# Patient Record
Sex: FEMALE | Race: BLACK OR AFRICAN AMERICAN
[De-identification: names, ages, dates, MRNs, and addresses within clinical notes are randomized per-mention and may not be internally consistent; named-entity substitution may affect disease eponyms.]

---

## 2019-06-03 ENCOUNTER — HOSPITAL ENCOUNTER (OUTPATIENT)
Dept: HOSPITAL 82 - ED | Age: 64
Setting detail: OBSERVATION
LOS: 2 days | Discharge: HOME | End: 2019-06-05
Attending: INTERNAL MEDICINE | Admitting: INTERNAL MEDICINE
Payer: MEDICARE

## 2019-06-03 VITALS — SYSTOLIC BLOOD PRESSURE: 129 MMHG | DIASTOLIC BLOOD PRESSURE: 76 MMHG

## 2019-06-03 VITALS — HEIGHT: 64 IN | WEIGHT: 187.39 LBS | BODY MASS INDEX: 31.99 KG/M2

## 2019-06-03 VITALS — DIASTOLIC BLOOD PRESSURE: 69 MMHG | SYSTOLIC BLOOD PRESSURE: 155 MMHG

## 2019-06-03 VITALS — SYSTOLIC BLOOD PRESSURE: 180 MMHG | DIASTOLIC BLOOD PRESSURE: 89 MMHG

## 2019-06-03 VITALS — DIASTOLIC BLOOD PRESSURE: 89 MMHG | SYSTOLIC BLOOD PRESSURE: 144 MMHG

## 2019-06-03 VITALS — DIASTOLIC BLOOD PRESSURE: 77 MMHG | SYSTOLIC BLOOD PRESSURE: 130 MMHG

## 2019-06-03 DIAGNOSIS — T46.5X6A: ICD-10-CM

## 2019-06-03 DIAGNOSIS — M17.0: ICD-10-CM

## 2019-06-03 DIAGNOSIS — I50.30: ICD-10-CM

## 2019-06-03 DIAGNOSIS — J20.9: ICD-10-CM

## 2019-06-03 DIAGNOSIS — K42.0: Primary | ICD-10-CM

## 2019-06-03 DIAGNOSIS — J44.0: ICD-10-CM

## 2019-06-03 DIAGNOSIS — I25.10: ICD-10-CM

## 2019-06-03 DIAGNOSIS — F17.200: ICD-10-CM

## 2019-06-03 DIAGNOSIS — Z91.120: ICD-10-CM

## 2019-06-03 DIAGNOSIS — K29.70: ICD-10-CM

## 2019-06-03 DIAGNOSIS — N18.9: ICD-10-CM

## 2019-06-03 DIAGNOSIS — I13.0: ICD-10-CM

## 2019-06-03 DIAGNOSIS — Z23: ICD-10-CM

## 2019-06-03 LAB
ALBUMIN SERPL-MCNC: 4.4 G/DL (ref 3.2–5)
ALP SERPL-CCNC: 89 U/L (ref 38–126)
ANION GAP SERPL CALCULATED.3IONS-SCNC: 15 MMOL/L
AST SERPL-CCNC: 15 U/L (ref 9–36)
BASOPHILS NFR BLD AUTO: 0 % (ref 0–3)
BILIRUB UR QL STRIP.AUTO: NEGATIVE
BUN SERPL-MCNC: 12 MG/DL (ref 8–23)
BUN/CREAT SERPL: 24
CHLORIDE SERPL-SCNC: 104 MMOL/L (ref 95–108)
CHOLEST SERPL-MCNC: 189 MG/DL (ref 0–199)
CHOLEST/HDLC SERPL: 3.3 {RATIO}
CO2 SERPL-SCNC: 25 MMOL/L (ref 22–30)
COLOR UR AUTO: YELLOW
CREAT SERPL-MCNC: 0.5 MG/DL (ref 0.5–1)
EOSINOPHIL NFR BLD AUTO: 0 % (ref 0–8)
ERYTHROCYTE [DISTWIDTH] IN BLOOD BY AUTOMATED COUNT: 16.9 % (ref 11.5–15.5)
GLUCOSE UR STRIP.AUTO-MCNC: NEGATIVE MG/DL
HCT VFR BLD AUTO: 42.3 % (ref 37–47)
HDLC SERPL-MCNC: 57 MG/DL (ref 40–?)
HGB BLD-MCNC: 13.5 G/DL (ref 12–16)
HGB UR QL STRIP.AUTO: (no result)
IMM GRANULOCYTES NFR BLD: 0.3 % (ref 0–5)
KETONES UR STRIP.AUTO-MCNC: NEGATIVE MG/DL
LDLC SERPL CALC-MCNC: 115 MG/DL
LEUKOCYTE ESTERASE UR QL STRIP.AUTO: NEGATIVE
LIPASE SERPL-CCNC: 57 U/L (ref 23–300)
LYMPHOCYTES NFR BLD: 11 % (ref 15–41)
MCH RBC QN AUTO: 26.8 PG  CALC (ref 26–32)
MCHC RBC AUTO-ENTMCNC: 31.9 G/L CALC (ref 32–36)
MCV RBC AUTO: 84.1 FL  CALC (ref 80–100)
MONOCYTES NFR BLD AUTO: 4 % (ref 2–13)
NEUTROPHILS # BLD AUTO: 7.39 THOU/UL (ref 2–7.15)
NEUTROPHILS NFR BLD AUTO: 85 % (ref 42–76)
NITRITE UR QL STRIP.AUTO: NEGATIVE
PH UR STRIP.AUTO: 6.5 [PH] (ref 4.5–8)
PLATELET # BLD AUTO: 271 THOU/UL (ref 130–400)
POTASSIUM SERPL-SCNC: 3.8 MMOL/L (ref 3.5–5.1)
PROT SERPL-MCNC: 7.6 G/DL (ref 6.3–8.2)
PROT UR QL STRIP.AUTO: 30 MG/DL
RBC # BLD AUTO: 5.03 MILL/UL (ref 4.2–5.6)
RBC #/AREA URNS HPF: (no result) RBC/HPF (ref 0–5)
SODIUM SERPL-SCNC: 140 MMOL/L (ref 137–146)
SP GR UR STRIP.AUTO: 1.01
SQUAMOUS URNS QL MICRO: (no result) EPI/HPF
TRIGL SERPL-MCNC: 83 MG/DL (ref 30–149)
UROBILINOGEN UR QL STRIP.AUTO: 0.2 E.U./DL
VLDLC SERPL CALC-MCNC: 17 MG/DL

## 2019-06-03 PROCEDURE — S0164 INJECTION PANTROPRAZOLE: HCPCS

## 2019-06-03 NOTE — NUR
REPORT RECEIVED FROM BRENDON IN ED, PT ARRIVED ON UNIT @ 1232 VIA STRETCHER BY
ED STAFF, ALERT AND ORIENTED X 4, ORIENTED TO ROOM AND CALL BELL. PT C/O ABD
PAIN AT 10/10 AT THIS TIME, VITAL SIGNS MEASURED AND RECORDED, CALL BELL IN
REACH, WILL CONTINUE TO MONITOR.

## 2019-06-03 NOTE — NUR
DR GARCIA HERE ROUNDING, DISCUSSED PLAN OF CARE TO GIVE IV FLUIDS AND
ANALGESICS WHICH HE WILL PROCESS.

## 2019-06-03 NOTE — NUR
PATIENT RESTING AWAITNG DISPOSITION. PATIENT STATES PAIN IMPROVED SLIGHTYL FROM
BEING PREVIOUSLY MEDICATED PAIN 5 ON 0-10 SCALE. MD NOTIFIED

## 2019-06-03 NOTE — NUR
DR. GARCIA PLACED CONSULTATION IN FOR  WITH THIS PATIENT. WRITER
CALLED  REGARDING PT [phone]. GAVE HIM NAME, DIAGNOSIS, AND
ROOM NUMBER. DR. WHITE STATED HE WILL LOOK AT ALL THE TESTS ON PATIENT.

## 2019-06-03 NOTE — NUR
SECOND ATTEMPT TO CALL REPORT [facility] AND UNABLE
TO COME TO THE PHONE

## 2019-06-03 NOTE — NUR
PT RESTING IN BED, ALERT AND ORIENTED. RESPIRATIONS EVEN AND UNLABORED ON RA,
LUNGS SOUND COARSE. TELE IN PLACE. # 22 LFA IN FUSSING D5 1/2 NS @ 125. PEDAL
PULSES STRONG. PT EDUCATED ON NPO DIET. SAFETY PRECAUTIONS IN PLACE. WILL
CONTINUE TO MONITOR.

## 2019-06-03 NOTE — NUR
DR GARCIA HERE ROUNDING, DISCUSSED PLAN OF CARE AND WRITING ORDERS AT THIS
TIME. PT JUST HAD EPISODE VOMITUS, MD AWARE AND WILL ADDRESS CONDITION.

## 2019-06-03 NOTE — NUR
REPORT RECEIVED FROM JEANNE MILLER. PT RESTING IN BED, ALERT AND ORIENTED, PT
STATES HER PAIN IS MUCH BETTER AND IS NOW ONLY A 2/10. SAFETY PRECAUTIONS IN
PLACE. PT ENCOURAGED TO USE CALL ESCOBAR. WILL CONTINUE TO MONITOR.

## 2019-06-04 VITALS — DIASTOLIC BLOOD PRESSURE: 87 MMHG | SYSTOLIC BLOOD PRESSURE: 142 MMHG

## 2019-06-04 VITALS — DIASTOLIC BLOOD PRESSURE: 71 MMHG | SYSTOLIC BLOOD PRESSURE: 107 MMHG

## 2019-06-04 VITALS — SYSTOLIC BLOOD PRESSURE: 122 MMHG | DIASTOLIC BLOOD PRESSURE: 77 MMHG

## 2019-06-04 VITALS — DIASTOLIC BLOOD PRESSURE: 75 MMHG | SYSTOLIC BLOOD PRESSURE: 135 MMHG

## 2019-06-04 VITALS — DIASTOLIC BLOOD PRESSURE: 66 MMHG | SYSTOLIC BLOOD PRESSURE: 104 MMHG

## 2019-06-04 VITALS — DIASTOLIC BLOOD PRESSURE: 67 MMHG | SYSTOLIC BLOOD PRESSURE: 124 MMHG

## 2019-06-04 VITALS — DIASTOLIC BLOOD PRESSURE: 75 MMHG | SYSTOLIC BLOOD PRESSURE: 152 MMHG

## 2019-06-04 VITALS — DIASTOLIC BLOOD PRESSURE: 60 MMHG | SYSTOLIC BLOOD PRESSURE: 132 MMHG

## 2019-06-04 LAB
ALBUMIN SERPL-MCNC: 3.3 G/DL (ref 3.2–5)
ALP SERPL-CCNC: 58 U/L (ref 38–126)
ANION GAP SERPL CALCULATED.3IONS-SCNC: 13 MMOL/L
AST SERPL-CCNC: 24 U/L (ref 9–36)
BASOPHILS NFR BLD AUTO: 0 % (ref 0–3)
BUN SERPL-MCNC: 14 MG/DL (ref 8–23)
BUN/CREAT SERPL: 21
CHLORIDE SERPL-SCNC: 103 MMOL/L (ref 95–108)
CO2 SERPL-SCNC: 25 MMOL/L (ref 22–30)
CREAT SERPL-MCNC: 0.7 MG/DL (ref 0.5–1)
EOSINOPHIL NFR BLD AUTO: 1 % (ref 0–8)
ERYTHROCYTE [DISTWIDTH] IN BLOOD BY AUTOMATED COUNT: 17.1 % (ref 11.5–15.5)
HCT VFR BLD AUTO: 36.1 % (ref 37–47)
HGB BLD-MCNC: 11.6 G/DL (ref 12–16)
IMM GRANULOCYTES NFR BLD: 0.3 % (ref 0–5)
LYMPHOCYTES NFR BLD: 28 % (ref 15–41)
MCH RBC QN AUTO: 26.9 PG  CALC (ref 26–32)
MCHC RBC AUTO-ENTMCNC: 32.1 G/L CALC (ref 32–36)
MCV RBC AUTO: 83.6 FL  CALC (ref 80–100)
MONOCYTES NFR BLD AUTO: 11 % (ref 2–13)
NEUTROPHILS # BLD AUTO: 4.14 THOU/UL (ref 2–7.15)
NEUTROPHILS NFR BLD AUTO: 59 % (ref 42–76)
PLATELET # BLD AUTO: 251 THOU/UL (ref 130–400)
POTASSIUM SERPL-SCNC: 3.6 MMOL/L (ref 3.5–5.1)
PROT SERPL-MCNC: 5.9 G/DL (ref 6.3–8.2)
RBC # BLD AUTO: 4.32 MILL/UL (ref 4.2–5.6)
SODIUM SERPL-SCNC: 138 MMOL/L (ref 137–146)

## 2019-06-04 PROCEDURE — 3E0234Z INTRODUCTION OF SERUM, TOXOID AND VACCINE INTO MUSCLE, PERCUTANEOUS APPROACH: ICD-10-PCS | Performed by: INTERNAL MEDICINE

## 2019-06-04 NOTE — NUR
ASSESSMENT DONE. PT IS A&O X3. PT STATED PAIN IN ABD 8/10. MEDICATED PT WITH
DILAUDID SEE EMAR. IVF INFUSING WELL. PT NPO. TELE IN PLACE. SAFETY
PRECAUTIONS REINFORCED AND CALL LIGHT IN REACH.

## 2019-06-04 NOTE — NUR
PT RESTING IN BED, WITH EYES CLOSED. RESPIRATIONS EVEN AND UNLABORED ON RA. NO
S/S OF DISTRESS AT THIS TIME. WILL CONTINUE TO MONITOR.

## 2019-06-04 NOTE — NUR
PT RESTING IN BED, ALERT AND ORIENTED. REPIRATIONS EVEN AND UNLABORED ON RA,
LUNGS SOUND COARSE THROUGH OUT. #22 LFA INFUSING D5 1/2 NS @ 125 ML/HR,
APPEARS HEALTHY. TELE IN PLACE. PT ENCOURAGED TO USE CALL BELL IF NEEDS SHOULD
ARISE. SAFETY PRECAUTIONS IN PLACE. WILL CONTINUE TO MONITOR.

## 2019-06-04 NOTE — NUR
REPORT RECEIVED FROM JEANNE PAYTON. PT RESTING IN BED. ALERT AND ORIENTED. NO
S/S OF DISTRESS AT THIS TIME. WILL CONTINUE TO MONITOR.

## 2019-06-04 NOTE — NUR
PT RESTING IN BED. RESPIRATIONS EVEN AND UNLABORED ON RA. NO S/S OF DISTRESS
AT THIS TIME. WILL CONTIUE TO MONITOR.

## 2019-06-04 NOTE — NUR
PT USING THE BSC. PT STATED PAIN IN ABD 6/10. TOLD PT PAIN MEDICATION IS NOT
DUE YET. PT VERBALIZED UNDERSTANDING. PT DENIES ANY OTHER NEEDS AT THIS TIME.
CALL LIGHT IN REACH.

## 2019-06-05 VITALS — DIASTOLIC BLOOD PRESSURE: 74 MMHG | SYSTOLIC BLOOD PRESSURE: 113 MMHG

## 2019-06-05 VITALS — DIASTOLIC BLOOD PRESSURE: 79 MMHG | SYSTOLIC BLOOD PRESSURE: 156 MMHG

## 2019-06-05 VITALS — SYSTOLIC BLOOD PRESSURE: 166 MMHG | DIASTOLIC BLOOD PRESSURE: 76 MMHG

## 2019-06-05 VITALS — DIASTOLIC BLOOD PRESSURE: 71 MMHG | SYSTOLIC BLOOD PRESSURE: 113 MMHG

## 2019-06-05 LAB
ANION GAP SERPL CALCULATED.3IONS-SCNC: 11 MMOL/L
BASOPHILS NFR BLD AUTO: 0 % (ref 0–3)
BUN SERPL-MCNC: 10 MG/DL (ref 8–23)
BUN/CREAT SERPL: 15
CHLORIDE SERPL-SCNC: 105 MMOL/L (ref 95–108)
CO2 SERPL-SCNC: 26 MMOL/L (ref 22–30)
CREAT SERPL-MCNC: 0.6 MG/DL (ref 0.5–1)
EOSINOPHIL NFR BLD AUTO: 3 % (ref 0–8)
ERYTHROCYTE [DISTWIDTH] IN BLOOD BY AUTOMATED COUNT: 17.2 % (ref 11.5–15.5)
HCT VFR BLD AUTO: 36.6 % (ref 37–47)
HGB BLD-MCNC: 11.7 G/DL (ref 12–16)
IMM GRANULOCYTES NFR BLD: 0.2 % (ref 0–5)
LYMPHOCYTES NFR BLD: 28 % (ref 15–41)
MCH RBC QN AUTO: 27 PG  CALC (ref 26–32)
MCHC RBC AUTO-ENTMCNC: 32 G/L CALC (ref 32–36)
MCV RBC AUTO: 84.5 FL  CALC (ref 80–100)
MONOCYTES NFR BLD AUTO: 9 % (ref 2–13)
NEUTROPHILS # BLD AUTO: 3.84 THOU/UL (ref 2–7.15)
NEUTROPHILS NFR BLD AUTO: 60 % (ref 42–76)
PLATELET # BLD AUTO: 245 THOU/UL (ref 130–400)
POTASSIUM SERPL-SCNC: 3.7 MMOL/L (ref 3.5–5.1)
RBC # BLD AUTO: 4.33 MILL/UL (ref 4.2–5.6)
SODIUM SERPL-SCNC: 139 MMOL/L (ref 137–146)

## 2019-06-05 NOTE — NUR
DISCUSS WITH PT RE: HER D/C PAPERS. PT VERBALIZED UNDERSTANDING. PT STATED
WAITING FOR HER RIDE TO COME. CALL LIGHT IN REACH.

## 2019-06-05 NOTE — NUR
PT RESTING IN BED, RESPIRATIONS EVEN AND UNLABORED ON RA, NO S/S OF DISTRESS
AT THIS TIME. WILL CONTINUE TO MONITOR.

## 2019-06-05 NOTE — NUR
PT RESTING IN BED WITH EYES CLOSED, NO S/S OD DISTRESS AT THIS TIME. SAFETY
PRECAUTIONS IN PLACE. WILL CONTINUE TO MONITOR.

## 2020-04-15 ENCOUNTER — HOSPITAL ENCOUNTER (INPATIENT)
Dept: HOSPITAL 82 - ED | Age: 65
LOS: 5 days | Discharge: PSYCHIATRIC OR SUBSTANCE USE FACILITY | DRG: 337 | End: 2020-04-20
Attending: INTERNAL MEDICINE | Admitting: INTERNAL MEDICINE
Payer: MEDICARE

## 2020-04-15 VITALS — SYSTOLIC BLOOD PRESSURE: 198 MMHG | DIASTOLIC BLOOD PRESSURE: 99 MMHG

## 2020-04-15 VITALS — DIASTOLIC BLOOD PRESSURE: 109 MMHG | SYSTOLIC BLOOD PRESSURE: 189 MMHG

## 2020-04-15 VITALS — SYSTOLIC BLOOD PRESSURE: 204 MMHG | DIASTOLIC BLOOD PRESSURE: 106 MMHG

## 2020-04-15 VITALS — HEIGHT: 64 IN | WEIGHT: 185.31 LBS | BODY MASS INDEX: 31.64 KG/M2

## 2020-04-15 VITALS — SYSTOLIC BLOOD PRESSURE: 199 MMHG | DIASTOLIC BLOOD PRESSURE: 104 MMHG

## 2020-04-15 VITALS — DIASTOLIC BLOOD PRESSURE: 98 MMHG | SYSTOLIC BLOOD PRESSURE: 168 MMHG

## 2020-04-15 VITALS — SYSTOLIC BLOOD PRESSURE: 179 MMHG | DIASTOLIC BLOOD PRESSURE: 104 MMHG

## 2020-04-15 VITALS — DIASTOLIC BLOOD PRESSURE: 106 MMHG | SYSTOLIC BLOOD PRESSURE: 169 MMHG

## 2020-04-15 VITALS — SYSTOLIC BLOOD PRESSURE: 195 MMHG | DIASTOLIC BLOOD PRESSURE: 98 MMHG

## 2020-04-15 VITALS — DIASTOLIC BLOOD PRESSURE: 100 MMHG | SYSTOLIC BLOOD PRESSURE: 180 MMHG

## 2020-04-15 VITALS — SYSTOLIC BLOOD PRESSURE: 205 MMHG | DIASTOLIC BLOOD PRESSURE: 110 MMHG

## 2020-04-15 VITALS — DIASTOLIC BLOOD PRESSURE: 110 MMHG | SYSTOLIC BLOOD PRESSURE: 199 MMHG

## 2020-04-15 VITALS — SYSTOLIC BLOOD PRESSURE: 185 MMHG | DIASTOLIC BLOOD PRESSURE: 102 MMHG

## 2020-04-15 DIAGNOSIS — I10: ICD-10-CM

## 2020-04-15 DIAGNOSIS — E87.6: ICD-10-CM

## 2020-04-15 DIAGNOSIS — Z11.59: ICD-10-CM

## 2020-04-15 DIAGNOSIS — J44.9: ICD-10-CM

## 2020-04-15 DIAGNOSIS — T46.5X6A: ICD-10-CM

## 2020-04-15 DIAGNOSIS — K56.51: Primary | ICD-10-CM

## 2020-04-15 DIAGNOSIS — K63.89: ICD-10-CM

## 2020-04-15 DIAGNOSIS — Z91.128: ICD-10-CM

## 2020-04-15 DIAGNOSIS — I16.0: ICD-10-CM

## 2020-04-15 DIAGNOSIS — F17.200: ICD-10-CM

## 2020-04-15 LAB
ALBUMIN SERPL-MCNC: 3.9 G/DL (ref 3.2–5)
ALP SERPL-CCNC: 84 U/L (ref 38–126)
ANION GAP SERPL CALCULATED.3IONS-SCNC: 9 MMOL/L
AST SERPL-CCNC: 37 U/L (ref 9–36)
BASOPHILS NFR BLD AUTO: 0 % (ref 0–3)
BILIRUB UR QL STRIP.AUTO: NEGATIVE
BUN SERPL-MCNC: 12 MG/DL (ref 8–23)
BUN/CREAT SERPL: 23
CHLORIDE SERPL-SCNC: 101 MMOL/L (ref 95–108)
CO2 SERPL-SCNC: 30 MMOL/L (ref 22–30)
COLOR UR AUTO: YELLOW
CREAT SERPL-MCNC: 0.5 MG/DL (ref 0.5–1)
EOSINOPHIL NFR BLD AUTO: 0 % (ref 0–8)
ERYTHROCYTE [DISTWIDTH] IN BLOOD BY AUTOMATED COUNT: 16.4 % (ref 11.5–15.5)
GLUCOSE UR STRIP.AUTO-MCNC: NEGATIVE MG/DL
HCT VFR BLD AUTO: 38.6 % (ref 37–47)
HGB BLD-MCNC: 12.3 G/DL (ref 12–16)
HGB UR QL STRIP.AUTO: (no result)
IMM GRANULOCYTES NFR BLD: 0.3 % (ref 0–5)
KETONES UR STRIP.AUTO-MCNC: NEGATIVE MG/DL
LEUKOCYTE ESTERASE UR QL STRIP.AUTO: NEGATIVE
LIPASE SERPL-CCNC: 47 U/L (ref 23–300)
LYMPHOCYTES NFR BLD: 9 % (ref 15–41)
MCH RBC QN AUTO: 26.6 PG  CALC (ref 26–32)
MCHC RBC AUTO-ENTMCNC: 31.9 G/DL CAL (ref 32–36)
MCV RBC AUTO: 83.4 FL  CALC (ref 80–100)
MONOCYTES NFR BLD AUTO: 3 % (ref 2–13)
NEUTROPHILS # BLD AUTO: 7.8 THOU/UL (ref 2–7.15)
NEUTROPHILS NFR BLD AUTO: 87 % (ref 42–76)
NITRITE UR QL STRIP.AUTO: NEGATIVE
PH UR STRIP.AUTO: 7 [PH] (ref 4.5–8)
PLATELET # BLD AUTO: 301 THOU/UL (ref 130–400)
POTASSIUM SERPL-SCNC: 3.7 MMOL/L (ref 3.5–5.1)
PROT SERPL-MCNC: 7.1 G/DL (ref 6.3–8.2)
PROT UR QL STRIP.AUTO: 100 MG/DL
RBC # BLD AUTO: 4.63 MILL/UL (ref 4.2–5.6)
RBC #/AREA URNS HPF: (no result) RBC/HPF (ref 0–5)
SODIUM SERPL-SCNC: 136 MMOL/L (ref 137–146)
SP GR UR STRIP.AUTO: >=1.03
SQUAMOUS URNS QL MICRO: (no result) EPI/HPF
UROBILINOGEN UR QL STRIP.AUTO: 0.2 E.U./DL
WBC #/AREA URNS HPF: (no result) WBC/HPF (ref 0–5)

## 2020-04-15 PROCEDURE — 05HM33Z INSERTION OF INFUSION DEVICE INTO RIGHT INTERNAL JUGULAR VEIN, PERCUTANEOUS APPROACH: ICD-10-PCS | Performed by: FAMILY MEDICINE

## 2020-04-15 PROCEDURE — G0378 HOSPITAL OBSERVATION PER HR: HCPCS

## 2020-04-15 NOTE — NUR
UNABLE TO ESTABLISH IV ACCESS AFTER MX ATTEMPTS. EDP UPDATED. PT C/O RLQ PAIN
10/10 AND NAUSEA. PT STATES SHE HAS NOT TAKEN BP MEDS TODAY. DENIES CP OR SOB.

## 2020-04-15 NOTE — NUR
#18 FR NG TUBE INSERTED LT NARE WITHOUT DIFFICULTY. RETURNED CLEAR LIGHT GREEN
FLUID TO LOW INTERMITTENT SUCTION. MAINTAINING O2 SAT 97% ON O2@2LPM VIA NC. PT
UPDATED ON POC AND REASON FOR NG TUBE

## 2020-04-15 NOTE — NUR
PATIENT'S GRAND-DAUGHTER CALLED WHICH SHE IS ON CHART AS ANI TO NOTIFY IN
CASE OF EMERGENCY, GRAND DAUGHTER REPORTS SHE WILL CALL PATIENT AS I LET HER
LNOW PATINT HAS HER CELLPHONE AT BEDSIDE.

## 2020-04-15 NOTE — NUR
RESTARTED CARDENE GTT AT 5MG/HR. PT DENIES CP,SOB OR NAUSEA. PT STATES "I FEEL
SO MUCH BETTER.  UPDATED ON POC.

## 2020-04-15 NOTE — NUR
DR UMANA INSERTED RIJ TRIPLE LUMEN CENTRAL LINE WIHTOUT DIFFICULTY. PT TOLERATED
FAIR, EMESIS BRIGHT YELLOW X1, SAT IN HIGH FOWLERS POSITION AND TOLEARTED WELL.
MEDICATED AS ORDERED FOR PAIN AND NAUSEA

## 2020-04-15 NOTE — NUR
CALLED AND SPOKE TO DR CHAPMAN TO NOTIFY HIM FOR A CONSULT FOR THIS PATIENT
FOR A PARTIAL SMALL BOWEL OBSTRUCTION.

## 2020-04-15 NOTE — NUR
PATIENT'S SISTER CALLED FOR INFO ON PATIENT, PT WAS ASKED IF SHE GIVES
PERMISSION FOR ME TO UPDATE HER SISTER, PATIENT CONSENTS. UPDATE GIVEN TO
SISTER.

## 2020-04-15 NOTE — NUR
PATIENT ASSISTED TO BSC, PT REPORTS SHE HAD TO HAVE A BM. PT DID NOT HAVE A
BM, ONLY VOIDED. SHE WAS ABLE TO CLEAN HERSELF. PUT A DISPOSABLE PAD LINER AND
NETTED UNDERWEAR ON, PURE WICK PLACED BACK ON. DAUGHTER ALSO CALLED FOR
UPDATES AT THIS TIME, SHE WAS ABLE TO PROVIDE PASSCODE.

## 2020-04-15 NOTE — NUR
PATIENT ARRIVES VIA ER STRETCHER ON CARDIAC MONITOR, SALINE LOCKED,
ACCOMPANIED BY 2 ER NURSES. PATIENT IS ALERT AND ORIENTED X4. REPORTS
ABDOMINAL PAIN RATES 8 OF 10 AND NAUSEA. LEFT NARE NG TUBE INTCAT, PLACED BACK
ON LIS. ARRIVED WITH TANMAY, PLACE ON CHS. RIJ 3 LUMEN INTACT, FLUSHES
PROPERLY, SALINE LOCKED. RECEIVED IN REPORT SHE WAS PLACED ON NC 2 L/MIN DUE
TO RECEIVING MORPHINE AT ER. PATIENT PLACED ON NC AGAIN AND SATS GREATER THAN
95%, NO SOB NOTED, NO COMPLAINTS OF SOB. ADMISSION COMPLETED. NKA. BED ZEROED
BEFORE ARRIVAL, WEIGHT TAKEN 183.1 LBS. NURSING ASSESSMENT PERFORMED. PT
REPORTS LAST BM 4/14, PT REPORTS SHE HAS STRESS INCONTINENCE. CALL LIGHT
WITHIN REACH.

## 2020-04-16 VITALS — DIASTOLIC BLOOD PRESSURE: 85 MMHG | SYSTOLIC BLOOD PRESSURE: 195 MMHG

## 2020-04-16 VITALS — DIASTOLIC BLOOD PRESSURE: 84 MMHG | SYSTOLIC BLOOD PRESSURE: 160 MMHG

## 2020-04-16 VITALS — DIASTOLIC BLOOD PRESSURE: 82 MMHG | SYSTOLIC BLOOD PRESSURE: 175 MMHG

## 2020-04-16 VITALS — SYSTOLIC BLOOD PRESSURE: 116 MMHG | DIASTOLIC BLOOD PRESSURE: 67 MMHG

## 2020-04-16 VITALS — SYSTOLIC BLOOD PRESSURE: 142 MMHG | DIASTOLIC BLOOD PRESSURE: 81 MMHG

## 2020-04-16 VITALS — SYSTOLIC BLOOD PRESSURE: 179 MMHG | DIASTOLIC BLOOD PRESSURE: 98 MMHG

## 2020-04-16 VITALS — DIASTOLIC BLOOD PRESSURE: 82 MMHG | SYSTOLIC BLOOD PRESSURE: 159 MMHG

## 2020-04-16 VITALS — SYSTOLIC BLOOD PRESSURE: 187 MMHG | DIASTOLIC BLOOD PRESSURE: 101 MMHG

## 2020-04-16 VITALS — DIASTOLIC BLOOD PRESSURE: 112 MMHG | SYSTOLIC BLOOD PRESSURE: 182 MMHG

## 2020-04-16 VITALS — DIASTOLIC BLOOD PRESSURE: 95 MMHG | SYSTOLIC BLOOD PRESSURE: 194 MMHG

## 2020-04-16 VITALS — DIASTOLIC BLOOD PRESSURE: 80 MMHG | SYSTOLIC BLOOD PRESSURE: 143 MMHG

## 2020-04-16 VITALS — SYSTOLIC BLOOD PRESSURE: 159 MMHG | DIASTOLIC BLOOD PRESSURE: 73 MMHG

## 2020-04-16 VITALS — SYSTOLIC BLOOD PRESSURE: 178 MMHG | DIASTOLIC BLOOD PRESSURE: 85 MMHG

## 2020-04-16 VITALS — SYSTOLIC BLOOD PRESSURE: 159 MMHG | DIASTOLIC BLOOD PRESSURE: 89 MMHG

## 2020-04-16 VITALS — SYSTOLIC BLOOD PRESSURE: 195 MMHG | DIASTOLIC BLOOD PRESSURE: 95 MMHG

## 2020-04-16 VITALS — DIASTOLIC BLOOD PRESSURE: 99 MMHG | SYSTOLIC BLOOD PRESSURE: 185 MMHG

## 2020-04-16 VITALS — SYSTOLIC BLOOD PRESSURE: 191 MMHG | DIASTOLIC BLOOD PRESSURE: 89 MMHG

## 2020-04-16 VITALS — SYSTOLIC BLOOD PRESSURE: 180 MMHG | DIASTOLIC BLOOD PRESSURE: 85 MMHG

## 2020-04-16 VITALS — DIASTOLIC BLOOD PRESSURE: 71 MMHG | SYSTOLIC BLOOD PRESSURE: 133 MMHG

## 2020-04-16 VITALS — DIASTOLIC BLOOD PRESSURE: 95 MMHG | SYSTOLIC BLOOD PRESSURE: 181 MMHG

## 2020-04-16 VITALS — SYSTOLIC BLOOD PRESSURE: 152 MMHG | DIASTOLIC BLOOD PRESSURE: 80 MMHG

## 2020-04-16 VITALS — SYSTOLIC BLOOD PRESSURE: 168 MMHG | DIASTOLIC BLOOD PRESSURE: 83 MMHG

## 2020-04-16 VITALS — SYSTOLIC BLOOD PRESSURE: 161 MMHG | DIASTOLIC BLOOD PRESSURE: 74 MMHG

## 2020-04-16 VITALS — SYSTOLIC BLOOD PRESSURE: 176 MMHG | DIASTOLIC BLOOD PRESSURE: 102 MMHG

## 2020-04-16 VITALS — SYSTOLIC BLOOD PRESSURE: 159 MMHG | DIASTOLIC BLOOD PRESSURE: 85 MMHG

## 2020-04-16 VITALS — SYSTOLIC BLOOD PRESSURE: 160 MMHG | DIASTOLIC BLOOD PRESSURE: 84 MMHG

## 2020-04-16 VITALS — SYSTOLIC BLOOD PRESSURE: 156 MMHG | DIASTOLIC BLOOD PRESSURE: 86 MMHG

## 2020-04-16 VITALS — DIASTOLIC BLOOD PRESSURE: 107 MMHG | SYSTOLIC BLOOD PRESSURE: 157 MMHG

## 2020-04-16 VITALS — DIASTOLIC BLOOD PRESSURE: 91 MMHG | SYSTOLIC BLOOD PRESSURE: 160 MMHG

## 2020-04-16 VITALS — SYSTOLIC BLOOD PRESSURE: 169 MMHG | DIASTOLIC BLOOD PRESSURE: 89 MMHG

## 2020-04-16 VITALS — DIASTOLIC BLOOD PRESSURE: 97 MMHG | SYSTOLIC BLOOD PRESSURE: 188 MMHG

## 2020-04-16 VITALS — DIASTOLIC BLOOD PRESSURE: 81 MMHG | SYSTOLIC BLOOD PRESSURE: 145 MMHG

## 2020-04-16 VITALS — DIASTOLIC BLOOD PRESSURE: 79 MMHG | SYSTOLIC BLOOD PRESSURE: 165 MMHG

## 2020-04-16 VITALS — DIASTOLIC BLOOD PRESSURE: 78 MMHG | SYSTOLIC BLOOD PRESSURE: 164 MMHG

## 2020-04-16 VITALS — DIASTOLIC BLOOD PRESSURE: 90 MMHG | SYSTOLIC BLOOD PRESSURE: 159 MMHG

## 2020-04-16 VITALS — SYSTOLIC BLOOD PRESSURE: 164 MMHG | DIASTOLIC BLOOD PRESSURE: 82 MMHG

## 2020-04-16 VITALS — DIASTOLIC BLOOD PRESSURE: 104 MMHG | SYSTOLIC BLOOD PRESSURE: 203 MMHG

## 2020-04-16 VITALS — SYSTOLIC BLOOD PRESSURE: 147 MMHG | DIASTOLIC BLOOD PRESSURE: 77 MMHG

## 2020-04-16 VITALS — SYSTOLIC BLOOD PRESSURE: 204 MMHG | DIASTOLIC BLOOD PRESSURE: 104 MMHG

## 2020-04-16 VITALS — DIASTOLIC BLOOD PRESSURE: 87 MMHG | SYSTOLIC BLOOD PRESSURE: 173 MMHG

## 2020-04-16 VITALS — DIASTOLIC BLOOD PRESSURE: 79 MMHG | SYSTOLIC BLOOD PRESSURE: 143 MMHG

## 2020-04-16 VITALS — SYSTOLIC BLOOD PRESSURE: 154 MMHG | DIASTOLIC BLOOD PRESSURE: 85 MMHG

## 2020-04-16 VITALS — DIASTOLIC BLOOD PRESSURE: 86 MMHG | SYSTOLIC BLOOD PRESSURE: 177 MMHG

## 2020-04-16 VITALS — DIASTOLIC BLOOD PRESSURE: 88 MMHG | SYSTOLIC BLOOD PRESSURE: 167 MMHG

## 2020-04-16 VITALS — SYSTOLIC BLOOD PRESSURE: 177 MMHG | DIASTOLIC BLOOD PRESSURE: 82 MMHG

## 2020-04-16 VITALS — SYSTOLIC BLOOD PRESSURE: 189 MMHG | DIASTOLIC BLOOD PRESSURE: 87 MMHG

## 2020-04-16 VITALS — SYSTOLIC BLOOD PRESSURE: 144 MMHG | DIASTOLIC BLOOD PRESSURE: 74 MMHG

## 2020-04-16 VITALS — SYSTOLIC BLOOD PRESSURE: 177 MMHG | DIASTOLIC BLOOD PRESSURE: 84 MMHG

## 2020-04-16 VITALS — DIASTOLIC BLOOD PRESSURE: 71 MMHG | SYSTOLIC BLOOD PRESSURE: 136 MMHG

## 2020-04-16 VITALS — DIASTOLIC BLOOD PRESSURE: 88 MMHG | SYSTOLIC BLOOD PRESSURE: 185 MMHG

## 2020-04-16 VITALS — SYSTOLIC BLOOD PRESSURE: 181 MMHG | DIASTOLIC BLOOD PRESSURE: 86 MMHG

## 2020-04-16 VITALS — DIASTOLIC BLOOD PRESSURE: 85 MMHG | SYSTOLIC BLOOD PRESSURE: 149 MMHG

## 2020-04-16 VITALS — DIASTOLIC BLOOD PRESSURE: 86 MMHG | SYSTOLIC BLOOD PRESSURE: 165 MMHG

## 2020-04-16 LAB
ANION GAP SERPL CALCULATED.3IONS-SCNC: 8 MMOL/L
BASOPHILS NFR BLD AUTO: 0 % (ref 0–3)
BUN SERPL-MCNC: 10 MG/DL (ref 8–23)
BUN/CREAT SERPL: 17
CHLORIDE SERPL-SCNC: 100 MMOL/L (ref 95–108)
CO2 SERPL-SCNC: 30 MMOL/L (ref 22–30)
CREAT SERPL-MCNC: 0.6 MG/DL (ref 0.5–1)
EOSINOPHIL NFR BLD AUTO: 0 % (ref 0–8)
ERYTHROCYTE [DISTWIDTH] IN BLOOD BY AUTOMATED COUNT: 16.2 % (ref 11.5–15.5)
HCT VFR BLD AUTO: 38.7 % (ref 37–47)
HGB BLD-MCNC: 12.6 G/DL (ref 12–16)
IMM GRANULOCYTES NFR BLD: 0.4 % (ref 0–5)
LYMPHOCYTES NFR BLD: 11 % (ref 15–41)
MCH RBC QN AUTO: 27 PG  CALC (ref 26–32)
MCHC RBC AUTO-ENTMCNC: 32.6 G/DL CAL (ref 32–36)
MCV RBC AUTO: 82.9 FL  CALC (ref 80–100)
MONOCYTES NFR BLD AUTO: 6 % (ref 2–13)
NEUTROPHILS # BLD AUTO: 7.6 THOU/UL (ref 2–7.15)
NEUTROPHILS NFR BLD AUTO: 82 % (ref 42–76)
PLATELET # BLD AUTO: 294 THOU/UL (ref 130–400)
POTASSIUM SERPL-SCNC: 3.5 MMOL/L (ref 3.5–5.1)
RBC # BLD AUTO: 4.67 MILL/UL (ref 4.2–5.6)
SODIUM SERPL-SCNC: 136 MMOL/L (ref 137–146)

## 2020-04-16 PROCEDURE — 0DB84ZX EXCISION OF SMALL INTESTINE, PERCUTANEOUS ENDOSCOPIC APPROACH, DIAGNOSTIC: ICD-10-PCS | Performed by: SURGERY

## 2020-04-16 PROCEDURE — 0DNA4ZZ RELEASE JEJUNUM, PERCUTANEOUS ENDOSCOPIC APPROACH: ICD-10-PCS | Performed by: SURGERY

## 2020-04-16 NOTE — NUR
PT COMPLAINING OF SHARP IN AT INCISION SITE. PT TOO DROWSY FOR NARCOTICS AT
THIS TIME. DR. SU NOTIFIED. TORADOL ORDERED. WILL CONTINUE TO MONITOR.

## 2020-04-16 NOTE — NUR
PT STATED LAST TIME SHE TOOK HER BLOOD PRESSURE MEDICATIONS WAS TWO DAYS AGO.
PATIENT STATED SHE ONLY USES [facility] FOR MEDICATION. PER MEDICATION
RECONCILIATION LAST  WAS 9/11/19. PHARMACIST SILVER NOTIFIED.

## 2020-04-16 NOTE — NUR
OR NURSE TRANSFERRED PT TO OR. PT ON OXYGEN, MEDICATION CHART AND CONSENT SENT
WITH PATIENT. PT VERBALIZED UNDERSTANDING OF PROCEDURE/SURGERY. CONSENT
SIGNED. PT A&O X 4. CARDENE GTT SENT WITH PATIENT.

## 2020-04-16 NOTE — NUR
PT SITS UP ON SIDE OF BED, DRY HEAVING, ONLY SPITS OUT SALIVA. ASSISTED
BACK IN BED. SECURED NG TUBE ON NARE, INTACT AT LIS, PUREWICK INTACT AT [facility].
NO OTHER NEEDS AT THIS TIME. CALL LIGHT WITHIN REACH.

## 2020-04-16 NOTE — NUR
PATIENT PROVIDED WITH PAIN MEDICATION AS WELL AS NAUSEA MEDIACTION, PT DID
BEGIN TO DRY HEAVE AND ONLY VOMITTED CLEAR/WHITE SALIVA. WET WASH CLOTH
PROVIDED TO WIPE HER MOUTH. AFEBRILE. CALL LIGHT WITHIN REACH.

## 2020-04-16 NOTE — NUR
PATIENT ON CALL LIGHT, REQUESTED PAIN MEDICATION, EXPLAINED PAIN MEDICATION
FREQUENCY, PATIENT UNDERSTANDS AND AGREES. WILL MEDICATE AS SOON AS DUE.
PATIENT PULLED HERSELF UP. PUREWICK INTACT, NG TUBE INTACT. CALL LIGHT WITHIN
REACH.

## 2020-04-16 NOTE — NUR
PATIENT AWAKE, ALERT AND ORIENTED X4. COMPLAINS OF PAIN, RATES 8/10, SHARP ON
LEFT SIDE ABDOMEN, 3 LEFT SITE ABD INCISION SITES INTACT, NO DRAINAGE NOTED.
NO REDNESS OR TENDERNESS NOTED. ON NC 1 L/MIN, SATS GREATER THAN 95%, NO SOB
NOTED. RIJ 3 LUMEN INTACT, NS AND CARDENE DRIP INFUSING PROPERLY. CARDENE DRIP
TITARTED PER PROTOCOL. LOJA CATHETER INTACT AND PATIENT EDUCATED ON IT,
DRAINS YELLOW/CLEAR URINE. EDUCATE ON NPO AND ICE CHIPS AND MEDS PERMITTED.
EDUCATED ON IS. SCD'S PLACED ON. SR ON TELEMETRY. TEMP 99.0. CALL LIGHT WITHIN
REACH.

## 2020-04-16 NOTE — NUR
PATIENT WAS GIVEN PAIN AND NAUSEA MEDIACTION. COMPLAINS OF ABD PAIN RATES IT
8/10. ANTIBIOTIC ALSO INFUSING NOW PER MD ORDER. NICARDIPINE DRIP TITRATED PER
PROTOCOL. NG TUBE PLACEMENT ALSO VERIFIED, BROWN/TEA COLORED WITH FOOD
PARTICLES DRAINING OUT. NO OTHER NEEDS AT THIS TIME. CALL LIGHT WITHIN REACH.

## 2020-04-16 NOTE — NUR
PT RESTING IN BED. PT ON CARDENE GTT TO MAINTAIN SBP <180. PT STATING SHE HAS
PAIN IN ABDOMEN. PAIN MEDS TO BE GIVEN. PT COMPLAINING OF NAUSEA. NAUSEA
MEDICATIONS TO BE GIVEN. PUREWHICH IN PLACE. NG TUBE IN PLACE. PT TURNS SELF.
WILL CONTINUE TO MONITOR.

## 2020-04-16 NOTE — NUR
PATIENT LAYS WITH HOB 30 DEGREES. NICARDIPINE DRIP TITRATED PER PROTOCOL. PT
RESTS WITH EYES CLOSED, AWAKENS EASILY WITH VERBAL STIMULI. NO ACUTE DISTRESS
SHOWN. CALL LIGHT WITHIN REACH.

## 2020-04-16 NOTE — NUR
DR. WHITE AT BEDSIDE TO ASSESS PT. PLAN FOR SURGERY TODAY. WILL CALL
DAUGHTER SEAN AND UPDATE HER. normal...

## 2020-04-16 NOTE — NUR
PT RESTING IN BED WITH EYES CLOSED. PT OPENS EYES TO SPEECH. PT ABLE TO ANSWER
ORIENTATION QUESTION. PT VERY DROWSY WHEN NO STIMULATED. CARDENE GTT RUNNING.
NO SOB NOTED. NO DISTRESS NOTED. WILL CONTINUE TO MONITOR.

## 2020-04-16 NOTE — NUR
SPOKE TO DAUGHTER SEAN AND UPDATED HER ON PATIENT STATUS. ASKED DAUGHTER IF
SHE WAS AWARE IF PATIENT IS CURRENTLY TAKING HER BP MEDICATIONS; HOWEVER, THE
DAUGHTER HAS NOT PERSONALY SEEN THE PATIENT TAKE THEM. DAUGHTER ASKED TO BE
NOTIFIED WHEN PATIENT IS BEING DISCHARGED AND TO LET HER KNOW WHAT HER BLOOD
PRESSURE MEDICATIONS ARE SO SHE CAN MAKE SURE PATIENT TAKES THEM. DAUGHTER
ASKED FOR PATIENT CELL PHONE TO BE TURNED OFF/SILENCED FOR PATIENT TO GET
REST.

## 2020-04-16 NOTE — NUR
PT STATING SHE HAS PAIN. PT VERY DROWSY. PT REPOSITIONED FOR COMFORT. EDUCATED
PATEINT THAT SHES TOO DROWSY FOR PAIN MEDS AT THIS TIME. AFTER REPOSITIONED PT
FEELS BETTER. WILL CONTINUE TO MONITOR.

## 2020-04-16 NOTE — NUR
SPOKE TO DAUGHTER SEAN AND UPDATED HER ON PATIENT CONDITION. INFORMED HER
THAT PATIENT IS STABLE, THE SURGERY WENT WELL, PT BACK TO ICU5, PT ON NC, PT
ABLE TO ANSWER QUESTIONS, MONITORING BP. NOTIFIED HER THAT PATIENT PHONE IS
CURRENTLY DEAD AND WILL BE CHARGED AND GIVEN TO PATIENT. DAUGHTER SAID SHE
WILL CALL PATIENT SON AND SISTER TO GIVE THEM UPDATES.

## 2020-04-16 NOTE — NUR
PT RESTING IN BED. PT MORE ALERT; HOWEVER, STILL DROWSY. CARDENE GTT ON.
PURVI, NO BM TODAY. ICE CHIPS GIVE TO PATIENT. NO DISTRESS NOTED. NO SOB
NOTED. REPORT TO BE GIVEN TO NIGHT RN

## 2020-04-16 NOTE — NUR
PT BACK TO ICU5 FROM OR. PT ON 2LNC. NO DISTRESS NOTED. PT DROWSY, ABLE TO
ANSWER QUESTIONS AND FOLLOW COMMANDS APPROPRIATELY. PT RECEIVED WITHOUT
CARDENE GTT
RUNNING. PER OR NURSE CARDENE GTT WAS NOT ADMINISTERED DURING SURGERY OR
PACU. BLOOD PRESSURE 203/104. CARDENE GTT RESTARTED.

## 2020-04-16 NOTE — NUR
PATIRNT LAYS WITH HOB 30 DEGREES, AWAKENS WITH NOISE, IS DROWSY, NO COMPLAINTS
OF PAIN, NOTIFIED HER OF DAUGHTER CALLING A LEACING A MESSAGE FOR HER, SISTER
IN LAW CALLED AS WELL AND LEFT A MESSAGE FOR HER. NO OTHER NEEDS AT THIS TIME.
CALL LIGHT WITHIN REACH.

## 2020-04-16 NOTE — NUR
PATIENT ON CALL LIGHT, CONCERNED ABOUT MONITOR BEEPING, NURSE JAYDE EXPLAINED
WHY. I WENT IN ROOM SHORTLY AFTER AND SHE ASKED ME WHY THE MONITOR WAS NOT
WORKING, I EXPLAINED TO HER SHE WAS HAVING PVC'S AND THAT IS WHY IT WAS
BEEPING, SHE ASKED, "AM I DYING?" I TOLD HER SHE WAS NOT DYING, SHE WAS
STABLE. PATIENT IS ORIENTED X4. PT REQUESTED PAIN MEDICATION FOR PAIN RATE OF
6/10. PAIN MEDICATION PROVIDED, REQUESTED ICE CHIPS, PROVIDED. CALL LIGHT
WITHIN REACH.

## 2020-04-16 NOTE — NUR
SPOKE TO SON [phone] AND UPDATED HIM ON PATIENT STATUS. UPDATED HIM THAT
PT IS CURRENTLY IN THE OR. ALL QUESTIONS ANSWERED. WILL UPDATE AS NEEDED

## 2020-04-16 NOTE — NUR
PT RESTING IN BED WITH EYES CLOSED. PT SNORING. NO DISTRESS OR SOB NOTICIED.
WILL CONTINUE TO MONITOR.

## 2020-04-16 NOTE — NUR
SPOKE TO DAUGHTER SEAN [phone] AND UPDATED HER OF PT DIAGNOSIS AND PLAN
FOR SURGERY TODAY, DAUGHTER VERBALIZED UNDERSTANDING. ALL QUESTIONS ANSWERED.

## 2020-04-17 VITALS — SYSTOLIC BLOOD PRESSURE: 162 MMHG | DIASTOLIC BLOOD PRESSURE: 98 MMHG

## 2020-04-17 VITALS — DIASTOLIC BLOOD PRESSURE: 75 MMHG | SYSTOLIC BLOOD PRESSURE: 144 MMHG

## 2020-04-17 VITALS — DIASTOLIC BLOOD PRESSURE: 75 MMHG | SYSTOLIC BLOOD PRESSURE: 142 MMHG

## 2020-04-17 VITALS — DIASTOLIC BLOOD PRESSURE: 64 MMHG | SYSTOLIC BLOOD PRESSURE: 148 MMHG

## 2020-04-17 VITALS — SYSTOLIC BLOOD PRESSURE: 143 MMHG | DIASTOLIC BLOOD PRESSURE: 88 MMHG

## 2020-04-17 VITALS — DIASTOLIC BLOOD PRESSURE: 79 MMHG | SYSTOLIC BLOOD PRESSURE: 140 MMHG

## 2020-04-17 VITALS — SYSTOLIC BLOOD PRESSURE: 162 MMHG | DIASTOLIC BLOOD PRESSURE: 80 MMHG

## 2020-04-17 VITALS — SYSTOLIC BLOOD PRESSURE: 180 MMHG | DIASTOLIC BLOOD PRESSURE: 86 MMHG

## 2020-04-17 VITALS — DIASTOLIC BLOOD PRESSURE: 74 MMHG | SYSTOLIC BLOOD PRESSURE: 170 MMHG

## 2020-04-17 VITALS — DIASTOLIC BLOOD PRESSURE: 90 MMHG | SYSTOLIC BLOOD PRESSURE: 169 MMHG

## 2020-04-17 VITALS — DIASTOLIC BLOOD PRESSURE: 71 MMHG | SYSTOLIC BLOOD PRESSURE: 152 MMHG

## 2020-04-17 VITALS — SYSTOLIC BLOOD PRESSURE: 147 MMHG | DIASTOLIC BLOOD PRESSURE: 81 MMHG

## 2020-04-17 VITALS — DIASTOLIC BLOOD PRESSURE: 83 MMHG | SYSTOLIC BLOOD PRESSURE: 177 MMHG

## 2020-04-17 VITALS — SYSTOLIC BLOOD PRESSURE: 155 MMHG | DIASTOLIC BLOOD PRESSURE: 84 MMHG

## 2020-04-17 VITALS — DIASTOLIC BLOOD PRESSURE: 118 MMHG | SYSTOLIC BLOOD PRESSURE: 202 MMHG

## 2020-04-17 VITALS — DIASTOLIC BLOOD PRESSURE: 93 MMHG | SYSTOLIC BLOOD PRESSURE: 156 MMHG

## 2020-04-17 VITALS — SYSTOLIC BLOOD PRESSURE: 158 MMHG | DIASTOLIC BLOOD PRESSURE: 79 MMHG

## 2020-04-17 VITALS — DIASTOLIC BLOOD PRESSURE: 83 MMHG | SYSTOLIC BLOOD PRESSURE: 148 MMHG

## 2020-04-17 VITALS — DIASTOLIC BLOOD PRESSURE: 81 MMHG | SYSTOLIC BLOOD PRESSURE: 149 MMHG

## 2020-04-17 LAB
ALBUMIN SERPL-MCNC: 3.1 G/DL (ref 3.2–5)
ALP SERPL-CCNC: 63 U/L (ref 38–126)
ANION GAP SERPL CALCULATED.3IONS-SCNC: 7 MMOL/L
AST SERPL-CCNC: 42 U/L (ref 9–36)
BASOPHILS NFR BLD AUTO: 0 % (ref 0–3)
BUN SERPL-MCNC: 10 MG/DL (ref 8–23)
BUN/CREAT SERPL: 14
CHLORIDE SERPL-SCNC: 103 MMOL/L (ref 95–108)
CO2 SERPL-SCNC: 30 MMOL/L (ref 22–30)
CREAT SERPL-MCNC: 0.7 MG/DL (ref 0.5–1)
EOSINOPHIL NFR BLD AUTO: 1 % (ref 0–8)
ERYTHROCYTE [DISTWIDTH] IN BLOOD BY AUTOMATED COUNT: 16.6 % (ref 11.5–15.5)
HCT VFR BLD AUTO: 36.6 % (ref 37–47)
HGB BLD-MCNC: 11.5 G/DL (ref 12–16)
IMM GRANULOCYTES NFR BLD: 0.3 % (ref 0–5)
LYMPHOCYTES NFR BLD: 26 % (ref 15–41)
MCH RBC QN AUTO: 26.7 PG  CALC (ref 26–32)
MCHC RBC AUTO-ENTMCNC: 31.4 G/DL CAL (ref 32–36)
MCV RBC AUTO: 85.1 FL  CALC (ref 80–100)
MONOCYTES NFR BLD AUTO: 9 % (ref 2–13)
NEUTROPHILS # BLD AUTO: 5.91 THOU/UL (ref 2–7.15)
NEUTROPHILS NFR BLD AUTO: 64 % (ref 42–76)
PLATELET # BLD AUTO: 269 THOU/UL (ref 130–400)
POTASSIUM SERPL-SCNC: 3.4 MMOL/L (ref 3.5–5.1)
PROT SERPL-MCNC: 5.8 G/DL (ref 6.3–8.2)
RBC # BLD AUTO: 4.3 MILL/UL (ref 4.2–5.6)
SODIUM SERPL-SCNC: 137 MMOL/L (ref 137–146)

## 2020-04-17 NOTE — NUR
PT C/O ABD PAIN BUT THIS RN DOES NOT THINK MORE DILAUDID WOULD BE SAFE. PT IS
STILL VERY GROGGY & WEAK & BRADYCARDIC FROM LAST DOSE AT 0938. MD CONSULTED
FOR MED CHANGE.

## 2020-04-17 NOTE — NUR
NEW BAG OF IV FLUIDS INFUSING. PATIENT AWAKENS EASILY WITH VERBAL STIMULI, NO
NEEDS AT THIS TIME. NO ACUTE DISTRESS SHOWN. CALL LIGHT WITHIN REACH.

## 2020-04-17 NOTE — NUR
REPORT RECEIVED FROM JEANNE GRECO. PT RESTING IN BED NO S/S OF DISTRESS AT THIS
TIME. SAFETY PRECAUTIONS IN PLACE. WILL CONTINUE TO MONITOR.

## 2020-04-17 NOTE — NUR
PAIN MEDICATION GIVEN TO PATIENT PER REQUEST, RATES ABD PAIN 8/10. BLOOD DRAWN
FROM [facility] FOR LABS THIS AM. NO OTHER NEEDS AT THIS TIME. CALL LIGHT WITHIN
REACH.

## 2020-04-17 NOTE — NUR
DR SU @BEDSIDE. PT A&Ox3. DENIES PAIN AT THIS TIME. 3 SMALL LAPERSCOPIC
WOUNDS TO LEFT ABD. ACTIVE BS x4.

## 2020-04-17 NOTE — NUR
PT RESTING IN BED, ALERT AND ORIETNED. PT ASSISTED TO BSC AND BACK INTO BED.
RESPIRATIONS EVEN AND UNLABORED LUNGS SOUND CLEAR. PEDAL PULSES STRONG. PT
REPORTS PAIN OF A 9/10 IN ABD, PT TO BE MEDICATED PER EMAR ORDERS. SAFETY
PRECAUTIONS IN PLACE. WILL CONTINUE TO MONITOR.

## 2020-04-18 VITALS — SYSTOLIC BLOOD PRESSURE: 142 MMHG | DIASTOLIC BLOOD PRESSURE: 78 MMHG

## 2020-04-18 VITALS — SYSTOLIC BLOOD PRESSURE: 146 MMHG | DIASTOLIC BLOOD PRESSURE: 62 MMHG

## 2020-04-18 VITALS — SYSTOLIC BLOOD PRESSURE: 166 MMHG | DIASTOLIC BLOOD PRESSURE: 81 MMHG

## 2020-04-18 VITALS — DIASTOLIC BLOOD PRESSURE: 96 MMHG | SYSTOLIC BLOOD PRESSURE: 193 MMHG

## 2020-04-18 VITALS — DIASTOLIC BLOOD PRESSURE: 92 MMHG | SYSTOLIC BLOOD PRESSURE: 159 MMHG

## 2020-04-18 LAB
ALBUMIN SERPL-MCNC: 2.7 G/DL (ref 3.2–5)
ALP SERPL-CCNC: 53 U/L (ref 38–126)
ANION GAP SERPL CALCULATED.3IONS-SCNC: 6 MMOL/L
AST SERPL-CCNC: 23 U/L (ref 9–36)
BUN SERPL-MCNC: 10 MG/DL (ref 8–23)
BUN/CREAT SERPL: 14
CHLORIDE SERPL-SCNC: 106 MMOL/L (ref 95–108)
CO2 SERPL-SCNC: 28 MMOL/L (ref 22–30)
CREAT SERPL-MCNC: 0.7 MG/DL (ref 0.5–1)
ERYTHROCYTE [DISTWIDTH] IN BLOOD BY AUTOMATED COUNT: 16.7 % (ref 11.5–15.5)
HCT VFR BLD AUTO: 32.6 % (ref 37–47)
HGB BLD-MCNC: 10.2 G/DL (ref 12–16)
MCH RBC QN AUTO: 27 PG  CALC (ref 26–32)
MCHC RBC AUTO-ENTMCNC: 31.3 G/DL CAL (ref 32–36)
MCV RBC AUTO: 86.2 FL  CALC (ref 80–100)
PLATELET # BLD AUTO: 229 THOU/UL (ref 130–400)
POTASSIUM SERPL-SCNC: 3.3 MMOL/L (ref 3.5–5.1)
PROT SERPL-MCNC: 5.3 G/DL (ref 6.3–8.2)
RBC # BLD AUTO: 3.78 MILL/UL (ref 4.2–5.6)
SODIUM SERPL-SCNC: 137 MMOL/L (ref 137–146)

## 2020-04-18 NOTE — NUR
PT RESTING IN BED. NO S/S OF DISTRESS AT THIS TIME. SAFETY PRECAUTIONS IN
PLACE. WILL CONTINEU TO MONITOR.

## 2020-04-18 NOTE — NUR
PT SEEN BY DR BRUCE THIS MORNING, WILL PROBABLY DISCHARGE PT TOMORROW OR
MONDAY.  PT OOB AS NEEDED TO BSC, ENCOURAGED TO BE AMBULATORY IN ROOM MORE.

## 2020-04-18 NOTE — NUR
PT AWAKE, ALERT, ORIENTED X 3.  LUNGS CLEAR, RA.  PT AMBULATORY TO BSC.  PT
DOES HAVE SOME ABDOMINAL DISCOMFORT, PAIN MED PROVIDED AS NEEDED.  NO REPORT
OF NAUSEA OR VOMITING.

## 2020-04-18 NOTE — NUR
REPORT RECEIVED FROM JEANNE GRECO. PT RESTING IN BED. NO S/S OF DISTRESS, SAFETY
PRECAUTIONS IN PLACE. WILL CONTINUE TO MONITOR.

## 2020-04-18 NOTE — NUR
PT REMAINS AT REST IN THE BED, OCCASIONAL REQUEST FOR PAIN MEDICINE.  PT HOPES
FOR DISCHARGE HOME TOMORROW.  GRANDDAUGHTER HAS PICKED UP KEY TO HER HOUSE,
WHICH WAS LEFT IN LOBBY  FOR HER.  NO ACUTE DISTRESS NOTED.

## 2020-04-18 NOTE — NUR
PT RESTING IN BED, ALERT AND ORIENTED. RESPIRATIONS EVEN AND UNLABORED ON O2
@ 2L VIA NC. LUNGS SOUND CLEAR. PEDAL PULSES STRONG. PT REPORTS PAIN OF A
8/10, PT MEDICATED PER EMAR ORDERS. SAFETY PRECAUTIONS IN PLACE. WILL CONTINUE
TO MONITOR.

## 2020-04-19 VITALS — SYSTOLIC BLOOD PRESSURE: 154 MMHG | DIASTOLIC BLOOD PRESSURE: 82 MMHG

## 2020-04-19 VITALS — SYSTOLIC BLOOD PRESSURE: 155 MMHG | DIASTOLIC BLOOD PRESSURE: 85 MMHG

## 2020-04-19 VITALS — SYSTOLIC BLOOD PRESSURE: 178 MMHG | DIASTOLIC BLOOD PRESSURE: 82 MMHG

## 2020-04-19 VITALS — SYSTOLIC BLOOD PRESSURE: 152 MMHG | DIASTOLIC BLOOD PRESSURE: 76 MMHG

## 2020-04-19 VITALS — DIASTOLIC BLOOD PRESSURE: 82 MMHG | SYSTOLIC BLOOD PRESSURE: 154 MMHG

## 2020-04-19 LAB
ALBUMIN SERPL-MCNC: 2.8 G/DL (ref 3.2–5)
ALP SERPL-CCNC: 50 U/L (ref 38–126)
ANION GAP SERPL CALCULATED.3IONS-SCNC: 7 MMOL/L
AST SERPL-CCNC: 19 U/L (ref 9–36)
BASOPHILS NFR BLD AUTO: 0 % (ref 0–3)
BUN SERPL-MCNC: 11 MG/DL (ref 8–23)
BUN/CREAT SERPL: 18
CHLORIDE SERPL-SCNC: 107 MMOL/L (ref 95–108)
CO2 SERPL-SCNC: 27 MMOL/L (ref 22–30)
CREAT SERPL-MCNC: 0.6 MG/DL (ref 0.5–1)
EOSINOPHIL NFR BLD AUTO: 4 % (ref 0–8)
ERYTHROCYTE [DISTWIDTH] IN BLOOD BY AUTOMATED COUNT: 16.6 % (ref 11.5–15.5)
HCT VFR BLD AUTO: 31.7 % (ref 37–47)
HGB BLD-MCNC: 10.1 G/DL (ref 12–16)
IMM GRANULOCYTES NFR BLD: 0.3 % (ref 0–5)
LYMPHOCYTES NFR BLD: 25 % (ref 15–41)
MCH RBC QN AUTO: 27.2 PG  CALC (ref 26–32)
MCHC RBC AUTO-ENTMCNC: 31.9 G/DL CAL (ref 32–36)
MCV RBC AUTO: 85.2 FL  CALC (ref 80–100)
MONOCYTES NFR BLD AUTO: 12 % (ref 2–13)
NEUTROPHILS # BLD AUTO: 3.86 THOU/UL (ref 2–7.15)
NEUTROPHILS NFR BLD AUTO: 59 % (ref 42–76)
PLATELET # BLD AUTO: 212 THOU/UL (ref 130–400)
POTASSIUM SERPL-SCNC: 3 MMOL/L (ref 3.5–5.1)
PROT SERPL-MCNC: 5.3 G/DL (ref 6.3–8.2)
RBC # BLD AUTO: 3.72 MILL/UL (ref 4.2–5.6)
SODIUM SERPL-SCNC: 137 MMOL/L (ref 137–146)

## 2020-04-19 NOTE — NUR
PT RECEIVED PAIN MED THIS AFTERNOON FOR PERSISTENT ABDOMINAL DISCOMFORT.  PT
STATES THAT SHE WALKS AROUND THE ROOM WHEN SHE GETS UP TO USE BSC.

## 2020-04-19 NOTE — NUR
REPORT RECEIVED FROM JEANNE GRECO. PT RESTING IN BED, RESPIRATIONS EVEN AND
UNLABORED ON RA. NO S/S OF DISTRESS AT THIS TIME. SAFETY PRECAUTIONS IN PLACE.
WILL CONTINUE TO MONITOR.

## 2020-04-19 NOTE — NUR
PT RESTING IN BED. RESPIRATIONS EVEN AND UNLABORED ON RA, LUNGS SOUND
DIMINISHED. PT DENIES ANY PAIN OR DISCOMFORT AT THIS TIME. PEDAL PULES STRONG.
SAFETY PRECAUTIONS IN PLACE. WILL CONTINUE TO MONITOR.

## 2020-04-19 NOTE — NUR
PT AWAKE, ALERT, NAUSEATED THIS MORNING.  PT PROVIDED ZOFRAN FOR NAUSEA,
CONTINUES WITH HER BREAKFAST.  PT HOPES FOR DISCHARGE TO HOME TOMORROW,
ORIGINALLY WANTED TO GO HOME TODAY BUT NOT FEELING WELL ENOUGH.

## 2020-04-19 NOTE — NUR
PT RESTING IN BED, NO S/S OF DISTRESS AT THIS TIME. SAFETY PRECAUTIONS IN
PLACE. WILL CONTINUE TO MONTIOR.

## 2020-04-20 VITALS — SYSTOLIC BLOOD PRESSURE: 191 MMHG | DIASTOLIC BLOOD PRESSURE: 84 MMHG

## 2020-04-20 VITALS — SYSTOLIC BLOOD PRESSURE: 147 MMHG | DIASTOLIC BLOOD PRESSURE: 70 MMHG

## 2020-04-20 VITALS — DIASTOLIC BLOOD PRESSURE: 100 MMHG | SYSTOLIC BLOOD PRESSURE: 180 MMHG

## 2020-04-20 VITALS — DIASTOLIC BLOOD PRESSURE: 77 MMHG | SYSTOLIC BLOOD PRESSURE: 180 MMHG

## 2020-04-20 LAB
ANION GAP SERPL CALCULATED.3IONS-SCNC: 6 MMOL/L
BUN SERPL-MCNC: 9 MG/DL (ref 8–23)
BUN/CREAT SERPL: 14
CHLORIDE SERPL-SCNC: 110 MMOL/L (ref 95–108)
CO2 SERPL-SCNC: 27 MMOL/L (ref 22–30)
CREAT SERPL-MCNC: 0.6 MG/DL (ref 0.5–1)
POTASSIUM SERPL-SCNC: 3.5 MMOL/L (ref 3.5–5.1)
SODIUM SERPL-SCNC: 139 MMOL/L (ref 137–146)

## 2020-04-20 NOTE — NUR
ASSESSMENT IS COMPLTED: IV SITE IS FREE FROM REDNESS OR EDEMA. HR IS
REG,PULSES ARE STRONG X4, ABD IS SOFT WITH ACTIVE BS. BREATH SOUNDS ARE CLEAR
AND DIMINISHED.DRESSING ON ABD IS CDI. CONTINUE TO OSEBRVE AND MONITOR.

## 2020-04-20 NOTE — NUR
IV SITE DISCONTINUED CATHETER INTACT BY JANEL ANGEL. DISCHARGE INSTRUCTIONS
TYPED AND READY. CONTINUE TO OBSERVE AND MONITOR.

## 2020-04-20 NOTE — NUR
PT RESTING IN BED. RESPIRATIONS EVEN AND UNLABORED ON RA. NO S/S OF DISTRESS
AT THIS TIME. SAFETY PRECAUTIONS IN PLACE. WILL CONTINUE TO MONITOR.

## 2020-10-13 ENCOUNTER — HOSPITAL ENCOUNTER (INPATIENT)
Dept: HOSPITAL 82 - ED | Age: 65
LOS: 3 days | Discharge: HOME HEALTH | DRG: 292 | End: 2020-10-16
Attending: INTERNAL MEDICINE | Admitting: INTERNAL MEDICINE
Payer: MEDICARE

## 2020-10-13 VITALS — SYSTOLIC BLOOD PRESSURE: 116 MMHG | DIASTOLIC BLOOD PRESSURE: 63 MMHG

## 2020-10-13 VITALS — SYSTOLIC BLOOD PRESSURE: 149 MMHG | DIASTOLIC BLOOD PRESSURE: 82 MMHG

## 2020-10-13 VITALS — DIASTOLIC BLOOD PRESSURE: 85 MMHG | SYSTOLIC BLOOD PRESSURE: 150 MMHG

## 2020-10-13 VITALS — DIASTOLIC BLOOD PRESSURE: 89 MMHG | SYSTOLIC BLOOD PRESSURE: 173 MMHG

## 2020-10-13 VITALS — DIASTOLIC BLOOD PRESSURE: 94 MMHG | SYSTOLIC BLOOD PRESSURE: 172 MMHG

## 2020-10-13 VITALS — SYSTOLIC BLOOD PRESSURE: 170 MMHG | DIASTOLIC BLOOD PRESSURE: 114 MMHG

## 2020-10-13 VITALS — DIASTOLIC BLOOD PRESSURE: 86 MMHG | SYSTOLIC BLOOD PRESSURE: 165 MMHG

## 2020-10-13 VITALS — SYSTOLIC BLOOD PRESSURE: 164 MMHG | DIASTOLIC BLOOD PRESSURE: 85 MMHG

## 2020-10-13 VITALS — SYSTOLIC BLOOD PRESSURE: 139 MMHG | DIASTOLIC BLOOD PRESSURE: 71 MMHG

## 2020-10-13 VITALS — SYSTOLIC BLOOD PRESSURE: 165 MMHG | DIASTOLIC BLOOD PRESSURE: 84 MMHG

## 2020-10-13 VITALS — SYSTOLIC BLOOD PRESSURE: 155 MMHG | DIASTOLIC BLOOD PRESSURE: 80 MMHG

## 2020-10-13 VITALS — DIASTOLIC BLOOD PRESSURE: 81 MMHG | SYSTOLIC BLOOD PRESSURE: 152 MMHG

## 2020-10-13 VITALS — SYSTOLIC BLOOD PRESSURE: 117 MMHG | DIASTOLIC BLOOD PRESSURE: 97 MMHG

## 2020-10-13 VITALS — SYSTOLIC BLOOD PRESSURE: 117 MMHG | DIASTOLIC BLOOD PRESSURE: 68 MMHG

## 2020-10-13 VITALS — SYSTOLIC BLOOD PRESSURE: 154 MMHG | DIASTOLIC BLOOD PRESSURE: 83 MMHG

## 2020-10-13 VITALS — DIASTOLIC BLOOD PRESSURE: 89 MMHG | SYSTOLIC BLOOD PRESSURE: 160 MMHG

## 2020-10-13 VITALS — DIASTOLIC BLOOD PRESSURE: 90 MMHG | SYSTOLIC BLOOD PRESSURE: 172 MMHG

## 2020-10-13 VITALS — BODY MASS INDEX: 31.62 KG/M2 | WEIGHT: 185.19 LBS | HEIGHT: 64 IN

## 2020-10-13 VITALS — SYSTOLIC BLOOD PRESSURE: 116 MMHG | DIASTOLIC BLOOD PRESSURE: 68 MMHG

## 2020-10-13 DIAGNOSIS — J44.1: ICD-10-CM

## 2020-10-13 DIAGNOSIS — I50.1: ICD-10-CM

## 2020-10-13 DIAGNOSIS — M54.9: ICD-10-CM

## 2020-10-13 DIAGNOSIS — Z20.828: ICD-10-CM

## 2020-10-13 DIAGNOSIS — Z79.891: ICD-10-CM

## 2020-10-13 DIAGNOSIS — F17.200: ICD-10-CM

## 2020-10-13 DIAGNOSIS — K44.9: ICD-10-CM

## 2020-10-13 DIAGNOSIS — I11.0: Primary | ICD-10-CM

## 2020-10-13 DIAGNOSIS — I16.0: ICD-10-CM

## 2020-10-13 DIAGNOSIS — E87.2: ICD-10-CM

## 2020-10-13 DIAGNOSIS — Z91.128: ICD-10-CM

## 2020-10-13 DIAGNOSIS — I24.8: ICD-10-CM

## 2020-10-13 DIAGNOSIS — T46.5X6A: ICD-10-CM

## 2020-10-13 DIAGNOSIS — G89.29: ICD-10-CM

## 2020-10-13 DIAGNOSIS — E87.6: ICD-10-CM

## 2020-10-13 LAB
ALBUMIN SERPL-MCNC: 4 G/DL (ref 3.2–5)
ALP SERPL-CCNC: 85 U/L (ref 38–126)
AMYLASE SERPL-CCNC: 101 U/L (ref 30–110)
ANION GAP SERPL CALCULATED.3IONS-SCNC: 15 MMOL/L
AST SERPL-CCNC: 47 U/L (ref 9–36)
BASOPHILS NFR BLD AUTO: 1 % (ref 0–3)
BILIRUB UR QL STRIP.AUTO: NEGATIVE
BUN SERPL-MCNC: 19 MG/DL (ref 8–23)
BUN/CREAT SERPL: 23
CHLORIDE SERPL-SCNC: 107 MMOL/L (ref 95–108)
CO2 SERPL-SCNC: 22 MMOL/L (ref 22–30)
COLOR UR AUTO: YELLOW
CREAT SERPL-MCNC: 0.8 MG/DL (ref 0.5–1)
EOSINOPHIL NFR BLD AUTO: 3 % (ref 0–8)
EPI CELLS URNS QL MICRO: (no result) EPI/HPF
ERYTHROCYTE [DISTWIDTH] IN BLOOD BY AUTOMATED COUNT: 17 % (ref 11.5–15.5)
GLUCOSE UR STRIP.AUTO-MCNC: 250 MG/DL
HCT VFR BLD AUTO: 33 % (ref 37–47)
HGB BLD-MCNC: 9.7 G/DL (ref 12–16)
HGB UR QL STRIP.AUTO: (no result)
IMM GRANULOCYTES NFR BLD: 0.4 % (ref 0–5)
KETONES UR STRIP.AUTO-MCNC: NEGATIVE MG/DL
LEUKOCYTE ESTERASE UR QL STRIP.AUTO: NEGATIVE
LIPASE SERPL-CCNC: 137 U/L (ref 23–300)
LYMPHOCYTES NFR BLD: 40 % (ref 15–41)
MCH RBC QN AUTO: 23.3 PG  CALC (ref 26–32)
MCHC RBC AUTO-ENTMCNC: 29.4 G/DL CAL (ref 32–36)
MCV RBC AUTO: 79.3 FL  CALC (ref 80–100)
MONOCYTES NFR BLD AUTO: 8 % (ref 2–13)
MYOGLOBIN SERPL-MCNC: 31 NG/ML (ref 0–62)
NEUTROPHILS # BLD AUTO: 3.22 THOU/UL (ref 2–7.15)
NEUTROPHILS NFR BLD AUTO: 47 % (ref 42–76)
NITRITE UR QL STRIP.AUTO: NEGATIVE
PH UR STRIP.AUTO: 6 [PH] (ref 4.5–8)
PLATELET # BLD AUTO: 374 THOU/UL (ref 130–400)
POTASSIUM SERPL-SCNC: 3.4 MMOL/L (ref 3.5–5.1)
PROT SERPL-MCNC: 7.4 G/DL (ref 6.3–8.2)
PROT UR QL STRIP.AUTO: >=300 MG/DL
RBC # BLD AUTO: 4.16 MILL/UL (ref 4.2–5.6)
RBC #/AREA URNS HPF: (no result) RBC/HPF (ref 0–5)
SODIUM SERPL-SCNC: 141 MMOL/L (ref 137–146)
SP GR UR STRIP.AUTO: 1.02
UROBILINOGEN UR QL STRIP.AUTO: 0.2 E.U./DL
WBC #/AREA URNS HPF: (no result) WBC/HPF (ref 0–5)

## 2020-10-13 PROCEDURE — 5A09357 ASSISTANCE WITH RESPIRATORY VENTILATION, LESS THAN 24 CONSECUTIVE HOURS, CONTINUOUS POSITIVE AIRWAY PRESSURE: ICD-10-PCS | Performed by: EMERGENCY MEDICINE

## 2020-10-13 NOTE — NUR
PT TO ICU BED 5 VIA STRETCHER. PT ABLE TO TRANSFER SELF TO BED WITH MINIMAL
ASSISTANCE. PT IS ALERT AND ORIENTED X3. ADMISSION ASSESSMENT COMPLETED AT
THIS TIME. IV PATENT X2. ORIENTED TO ROOM, UNIT AND CALL LIGHT SYSTEM. CALL
LIGHT IN REACH. WILL CONTINUE TO MONITOR.

## 2020-10-13 NOTE — NUR
NITRO GTT STARTED PER PROVIDER ORDERS. PT EXPLAINED THE SIDE EFFECTS AND
PURPOSE OF MEDICATION. PT VERBALIZED UNDERSTANDING. PUREWICK CHANGED AT THIS
TIME AS WELL AND PLACED EXTENSION TUBING FOR COMFORT. PT SET UP FOR NOON
MEAL AS WELL. WILL CONTINUE TO MONITOR.

## 2020-10-13 NOTE — NUR
PT ARRIVES VIA EMS ON BIPAP ALERT IN RESP DISTRESS, RR 55.PT STATES SHE IS NON
COMPLIANT WITH BP MEDS. PT STATES SHE AWOKE SOB IN THE NIGHT.

## 2020-10-13 NOTE — NUR
PT RELATED PAIN MED HAS HELPED ABD PAIN, NOW RATED AT 7/10.  NO NEEDS AT THIS
TIME, CALL BELL IN REACH.

## 2020-10-13 NOTE — NUR
PLACED PT ON BIPAP AT ARRIVAL. 14/6/50%. ABG ORDERED AND COMPLETED. FIO2
DECREASED TO 35%.  JEANNE VARGAS AND DR. YORK AWARE

## 2020-10-13 NOTE — NUR
PT REPORTS NAUSEA AFTER EATING LUNCH. PT MEDICATED WITH ZOFRAN PER MAR. CALL
LIGHT IN REACH. WILL CONTINUE TO MONITOR.

## 2020-10-13 NOTE — NUR
PT RESTING IN BED WATCHING TV. RESP ARE EVEN AND UNLABORED. NO DISTRESS NOTED.
CALL LIGHT IN REACH. WILL CONTINUE TO MONITOR.

## 2020-10-13 NOTE — NUR
PT CHANGED OVER FROM BIPAP TO O2@2LPM VIA NC. PT TOLERATED WELL. VSS. BP
150/81, HR 79. UPDATED ON POC AND AGREEABLE. HAS HAD 1000CC CLEAR YELLOW URINE
OUTPUT VIA [employer]. EDP UPDATED. PT DENIES CP OR SOB AT THIS TIME.

## 2020-10-13 NOTE — NUR
RR 26 ON BIPAP, O2 SAT 99%. PT STATES SHE FEELS SO MUCH BETTER BUT RELATES
HEADACHE AND RUQ DISCOMFORT FOR "DAYS". DENIES N/V/D.DENIES CHEST PAIN.EDP
UPDATED ON NEW ORDERS RECEIVED.URINE OBTAINED BY [employer]. PT TOLERATING WELL

## 2020-10-13 NOTE — NUR
PT ON CALL LIGHT AND STATES THAT THE TRAMADOL DID NOT TAKE AWAY HER PAIN AND
THE PAIN IS WORSENING NOW. D IRENA TEIXEIRA NOTIFIED.

## 2020-10-13 NOTE — NUR
[T TO RADIOLOGY FOR CT SCAN PRIOR TO TRANSPORT TO ICU. EMPTIED 400CC CLEAR
LIGHT YELLOW URINE FROM PUREWICK

## 2020-10-14 VITALS — DIASTOLIC BLOOD PRESSURE: 74 MMHG | SYSTOLIC BLOOD PRESSURE: 132 MMHG

## 2020-10-14 VITALS — SYSTOLIC BLOOD PRESSURE: 136 MMHG | DIASTOLIC BLOOD PRESSURE: 74 MMHG

## 2020-10-14 VITALS — DIASTOLIC BLOOD PRESSURE: 95 MMHG | SYSTOLIC BLOOD PRESSURE: 148 MMHG

## 2020-10-14 VITALS — DIASTOLIC BLOOD PRESSURE: 65 MMHG | SYSTOLIC BLOOD PRESSURE: 116 MMHG

## 2020-10-14 VITALS — DIASTOLIC BLOOD PRESSURE: 67 MMHG | SYSTOLIC BLOOD PRESSURE: 142 MMHG

## 2020-10-14 VITALS — SYSTOLIC BLOOD PRESSURE: 116 MMHG | DIASTOLIC BLOOD PRESSURE: 68 MMHG

## 2020-10-14 VITALS — SYSTOLIC BLOOD PRESSURE: 153 MMHG | DIASTOLIC BLOOD PRESSURE: 80 MMHG

## 2020-10-14 VITALS — DIASTOLIC BLOOD PRESSURE: 80 MMHG | SYSTOLIC BLOOD PRESSURE: 155 MMHG

## 2020-10-14 VITALS — DIASTOLIC BLOOD PRESSURE: 65 MMHG | SYSTOLIC BLOOD PRESSURE: 136 MMHG

## 2020-10-14 VITALS — DIASTOLIC BLOOD PRESSURE: 69 MMHG | SYSTOLIC BLOOD PRESSURE: 127 MMHG

## 2020-10-14 VITALS — DIASTOLIC BLOOD PRESSURE: 72 MMHG | SYSTOLIC BLOOD PRESSURE: 139 MMHG

## 2020-10-14 VITALS — SYSTOLIC BLOOD PRESSURE: 147 MMHG | DIASTOLIC BLOOD PRESSURE: 76 MMHG

## 2020-10-14 VITALS — DIASTOLIC BLOOD PRESSURE: 59 MMHG | SYSTOLIC BLOOD PRESSURE: 115 MMHG

## 2020-10-14 VITALS — DIASTOLIC BLOOD PRESSURE: 76 MMHG | SYSTOLIC BLOOD PRESSURE: 120 MMHG

## 2020-10-14 VITALS — SYSTOLIC BLOOD PRESSURE: 143 MMHG | DIASTOLIC BLOOD PRESSURE: 65 MMHG

## 2020-10-14 VITALS — SYSTOLIC BLOOD PRESSURE: 141 MMHG | DIASTOLIC BLOOD PRESSURE: 85 MMHG

## 2020-10-14 VITALS — SYSTOLIC BLOOD PRESSURE: 126 MMHG | DIASTOLIC BLOOD PRESSURE: 72 MMHG

## 2020-10-14 VITALS — DIASTOLIC BLOOD PRESSURE: 66 MMHG | SYSTOLIC BLOOD PRESSURE: 129 MMHG

## 2020-10-14 VITALS — SYSTOLIC BLOOD PRESSURE: 143 MMHG | DIASTOLIC BLOOD PRESSURE: 75 MMHG

## 2020-10-14 LAB
ALBUMIN SERPL-MCNC: 3.4 G/DL (ref 3.2–5)
ALP SERPL-CCNC: 71 U/L (ref 38–126)
ANION GAP SERPL CALCULATED.3IONS-SCNC: 10 MMOL/L
AST SERPL-CCNC: 23 U/L (ref 9–36)
BASOPHILS NFR BLD AUTO: 0 % (ref 0–3)
BUN SERPL-MCNC: 24 MG/DL (ref 8–23)
BUN/CREAT SERPL: 29
CHLORIDE SERPL-SCNC: 103 MMOL/L (ref 95–108)
CHOLEST SERPL-MCNC: 183 MG/DL (ref 0–199)
CHOLEST/HDLC SERPL: 2.9 {RATIO}
CO2 SERPL-SCNC: 27 MMOL/L (ref 22–30)
CREAT SERPL-MCNC: 0.8 MG/DL (ref 0.5–1)
EOSINOPHIL NFR BLD AUTO: 0 % (ref 0–8)
ERYTHROCYTE [DISTWIDTH] IN BLOOD BY AUTOMATED COUNT: 17 % (ref 11.5–15.5)
HCT VFR BLD AUTO: 30.6 % (ref 37–47)
HDLC SERPL-MCNC: 64 MG/DL (ref 40–?)
HGB BLD-MCNC: 9.3 G/DL (ref 12–16)
IMM GRANULOCYTES NFR BLD: 0.3 % (ref 0–5)
LDLC SERPL CALC-MCNC: 101 MG/DL
LYMPHOCYTES NFR BLD: 20 % (ref 15–41)
MAGNESIUM SERPL-MCNC: 1.8 MG/DL (ref 1.6–2.3)
MCH RBC QN AUTO: 23.3 PG  CALC (ref 26–32)
MCHC RBC AUTO-ENTMCNC: 30.4 G/DL CAL (ref 32–36)
MCV RBC AUTO: 76.5 FL  CALC (ref 80–100)
MONOCYTES NFR BLD AUTO: 10 % (ref 2–13)
NEUTROPHILS # BLD AUTO: 6.51 THOU/UL (ref 2–7.15)
NEUTROPHILS NFR BLD AUTO: 69 % (ref 42–76)
PLATELET # BLD AUTO: 328 THOU/UL (ref 130–400)
POTASSIUM SERPL-SCNC: 3.9 MMOL/L (ref 3.5–5.1)
PROT SERPL-MCNC: 6.2 G/DL (ref 6.3–8.2)
RBC # BLD AUTO: 4 MILL/UL (ref 4.2–5.6)
SODIUM SERPL-SCNC: 136 MMOL/L (ref 137–146)
TRIGL SERPL-MCNC: 95 MG/DL (ref 30–149)
VLDLC SERPL CALC-MCNC: 19 MG/DL

## 2020-10-14 NOTE — NUR
PT RESTING IN BED, NO SIGNS OF DISTRESS NOTED, RESP EVEN AND UNLABORED.
DISCUSSED POC, PT VOICES NO NEEDS OR COMPLAINTS AT THIS TIME. ASSESSMENT
COMPLETED, CALL LIGHT IN REACH,CONTINUE TO MONITOR.

## 2020-10-14 NOTE — NUR
PT DAUGHTER CALLED, ASKED PT IF OK TO DISCUSS PT'S CONDITION, PT GAVE VERBAL
OK.  NO SOB AT THIS TIME.  REMAINS ON NITRO GTT AT THIS TIME.  NO DISTRESS
NOTED, PT RELATED SHE'LL SEE HOW HER ABD FEELS LATER.  CALL BELL IN REACH.

## 2020-10-14 NOTE — NUR
PT REPORT HAVING A MODERATE LOOSE BM AND A VOID. PT THEN BACK TO RECLINER
AWAITING TRANSFER TO [facility] SURG

## 2020-10-14 NOTE — NUR
PT HAD SMALL FIRM BM, PASSED FLATULENCE.  PT RELATED SMALL IMPROVEMENT IN
PAIN.  ASSISTED BACK TO BED.  CALL BELL IN REACH.

## 2020-10-14 NOTE — NUR
PT SITTING UP IN RECLINER. SET UP FOR NOON MEAL AT THIS TIME. PT VERBALIZES
FEELING BETTER AND UNDERSTANDING OF TRANSFER TO MED SURG.

## 2020-10-14 NOTE — NUR
pt to [facility] room 278 via wheelchair. tolerated transfer well.

## 2020-10-14 NOTE — NUR
PT RELATED HER LOWER ABD HAS INCREASED PAIN.  PT RELATED IT FEELS LIKE MAYBE I
NEED TO HAVE A BM.  ASSISTED PT TO BEDSIDE COMMODE.

## 2020-10-14 NOTE — NUR
PT RELATED SHE FELT WET.  ROLLED PT FOUND DRY PAD, WICK IN PLACE.  PT RELATED
SHE THOUGHT THAT CAME OUT OF HER.

## 2020-10-14 NOTE — NUR
PT ARRIVED TO MS VIA WC ACCOMPANIED BY YENI ANGEL. A&O X3. NO DISTRESS NOTED.
PT REPORTS TO BE DOING BETTER TODAY, C/O OF SLIGHT ABD PAIN BUT STATES SHE
DOES NOT NEED ANYTHING FOR IT AT THIS TIME. PT CURRENTLY 95% ON RA. ASSESSMENT
COMPLETED. ORIENTED PT TO ROOM. CALL LIGHT IN REACH. CONTINUE TO MONITOR.

## 2020-10-14 NOTE — NUR
PT CALLED C/O PAIN TO ARM WHERE IV SITE IS, WRITER ATTEMPTED 2 IV'S BOTH
UNSUCCESSFUL, RN AT BEDSIDE ATTEMPTING IV SITE. CALL LIGHT IN REACH, CONTINUE
TO MONITOR.

## 2020-10-14 NOTE — NUR
RECD MED LIST FROM PT'S PCP DR JOHNSON OFFICE, MOST RECENT MEDS ARE FROM APRIL
WITH THE EXCEPTION OF HER PAIN MEDS. SPOKE WITH PT WHO SAYS SHE HASN'T HAD AN
APPT WITH HIM FOR 6 MONTHS, SO NO MEDS HAVE BEEN FILLED SINCE THEN. PT SEEMS
TO BE VERY NONCOMPLIANT BUT CLAIMS SHE STILL HAS MEDS AT HOME, DIDN'T RUN OUT
OF STOCK. UPDATED MED LIST AS BEST AS POSSIBLE

## 2020-10-15 VITALS — DIASTOLIC BLOOD PRESSURE: 89 MMHG | SYSTOLIC BLOOD PRESSURE: 159 MMHG

## 2020-10-15 VITALS — DIASTOLIC BLOOD PRESSURE: 89 MMHG | SYSTOLIC BLOOD PRESSURE: 158 MMHG

## 2020-10-15 VITALS — DIASTOLIC BLOOD PRESSURE: 93 MMHG | SYSTOLIC BLOOD PRESSURE: 151 MMHG

## 2020-10-15 VITALS — SYSTOLIC BLOOD PRESSURE: 154 MMHG | DIASTOLIC BLOOD PRESSURE: 76 MMHG

## 2020-10-15 VITALS — SYSTOLIC BLOOD PRESSURE: 109 MMHG | DIASTOLIC BLOOD PRESSURE: 71 MMHG

## 2020-10-15 VITALS — DIASTOLIC BLOOD PRESSURE: 93 MMHG | SYSTOLIC BLOOD PRESSURE: 177 MMHG

## 2020-10-15 VITALS — DIASTOLIC BLOOD PRESSURE: 85 MMHG | SYSTOLIC BLOOD PRESSURE: 157 MMHG

## 2020-10-15 VITALS — SYSTOLIC BLOOD PRESSURE: 167 MMHG | DIASTOLIC BLOOD PRESSURE: 98 MMHG

## 2020-10-15 VITALS — SYSTOLIC BLOOD PRESSURE: 140 MMHG | DIASTOLIC BLOOD PRESSURE: 77 MMHG

## 2020-10-15 NOTE — NUR
PT IS A/O X3 RESTING IN CHAIR EATING LUNCH UPON ENTERING ROOM. RESPIRATIONS
ARE EVEN AND UNLABORED WITH NO SIGNS OF DISTRESS NOTED. PT REQUEST EYE DROPS
FOR DRY EYES. NORMAL SALINE FLUSH PROVIED. PT DENIES ANY PAIN OR ANY OTHER
NEEDS AT THIS TIME. ALL SAFETY PRECAUITONS ARE IN PLACE WITH CALL LIGHT IN
REACH. WILL CONTINUE TO MONITOR

## 2020-10-15 NOTE — NUR
PT CALLED ASKING FOR HER NURSE. UPON ENTERING ROOM WITH PT'S MEDICATIONS, PT
STATED THAT SHE HAD NOT SEEN ME ALL NIGHT, I AGREED AND APOLOGIZED, BUT
INFORMED PT THAT I HAD BEEN IN HER ROOM TWICE SINCE ARRIVING AND SHE APPEARED
TO BE SLEEPING SOUNDLY SO I DID NOT WANT TO WAKE HER UNLESS NECESSARY.  SHE
ADMITTED TO FALLING ASLEEP, BUT STATED "I SHOULD HAVE HAD MY MEDS AT NINE."  I
AGREED AND INFORMED HER THAT THEY ARE ORDERED FOR 9PM, BUT THAT I HAVE 4 PTS
AND CANNOT MEDICATE EACH ONE AT THE EXACT SAME TIME AND THAT I ALSO HAD A NEW
PT COME UP FROM ED THAT NEEDED TO BE SETTLED IN.  PT STATED, "I JUST THINK YOU
DON'T WANT TO TAKE CARE OF ME BECAUSE I AM BLACK." I REASSURED THE PT THAT
THIS WAS NOT THE CASE AND THAT I JUST DID NOT WANT TO WAKE HER WHEN SHE
APPEARED TO BE RESTING AND THAT I ALSO HAD OTHER PTS THAT I WAS SEEING AT THE
TIME.  I ASSURED THE PT THAT HER CARE WAS VERY IMPORTANT TO ME AND IF SHE
NEEDS ME AT ALL, SHE CAN PUSH HER CALL LIGHT BUTTON/SHOWING PT THE RED BUTTON.
SHE STATED, "I DID NOT WANT TO BOTHER YOU AND I DID FALL ASLEEP, I'M SORRY."
SHE STATED SHE WAS JUST WORRIED ABOUT HER MEDICATIONS.  I REVIEWED THE
MEDICATIONS BEING ADMINISTERED AT THIS TIME WITH PT, SHE VERBALIZED
UNDERSTANDING. BSC EMPTIED FOR PT AND POC DISCUSSED AT THIS TIME.  SNACK/DRINK
WITH ICE PROVIDED FOR COMFORT. PT DENIES ANY OTHER NEEDS AT THIS TIME. CALL
LIGHT COMFIRMED W/IN REACH AND PT ENCOURAGED TO CALL AS ANY NEEDS ARISE.
PT VERBALIZED UNDERSTANDING AND AGREED TO CALL ME IF SHE NEEDED ME AT ALL.

## 2020-10-15 NOTE — NUR
PT C/O BURNING PAIN TO HANDS, STATES THEY FEEL NUMB, BOTH HANDS WARM TO TOUCH,
RADIAL PULSES EQUAL AND STRONG. PT MEDICATED WITH TYLENOL AND WARM COMPRESSES
GIVEN UPON REQUEST. PT ASSISTED TO BSC, CNA AT BEDSIDE FOR AM VITALS, CALL
LIGHT IN REACH,CONTINUE TO MONITOR.

## 2020-10-15 NOTE — NUR
RECIEVED REPORT FROM DULCE LUEVANO LPN. PT RESTING IN SEMI FOWLERS POSIITION UPON
ENTERING ROOM. INTRODUCED SELF TO PT AND DISCUSSED POC. PT IS A/O X3.
ASSESSMENT AND VITALS COMPLETED AT THIS TIME. /89, HR 69, O2 97% ON ROOM
AIR. RESPIRATIONS ARE EVEN AND UNLABORED WITH NO SIGNS OF DISTRESS NOTED. LUNG
SOUNDS ARE DIMINISHED.  HEART RHYTHM IS NORMAL WITH TELE IN PLACE. BOWEL SOUND
SARE ACTIVE IN ALL QUADRANTS, LAST REPORTED BM 10/15/2020. RADIAL AND PEDAL
PULSES ARE STRONG WITH NORMAL CAPILLARY REFILL. #22G IN RH FLUSHED, SITE
APPEARS HEALTHY AND PATENT. PT DENIES OF ANY PAIN OR DISCOMFORTS AT THIS TIME.
ALL SAFTEY PRECAUTIONS ARE IN PLACE WIHT CALL LIGHT IN REACH. WILL CONTINUE TO
MONITOR

## 2020-10-15 NOTE — NUR
PT RESTING IN BED, BP ELEVATED, PT MEDICATED WITH AM MEDICATIONS, WILL RECHECK
BP IN 1HR, PT STATES HER HANDS ARE STARTING TO FEEL BETTER. CALL LIGHT IN
REACH,CONTINUE TO MONITOR.

## 2020-10-15 NOTE — NUR
PT APPEARS TO BE SLEEPING STILL AT THIS TIME. WILL FOLLOW-UP WITH ASSESSMENT
AND MEDICATIONS AS ORDERS PROVIDE. NO S/O DISTRESS. CALL LIGHT AT SIDE.

## 2020-10-15 NOTE — NUR
PT RESTING IN SEMI FOWLERS POSITION UPON ENTERING ROOM. RESPIRATIONS ARE EVEN
AND UNLABROED WITH NO SIGNS OF DISTRESS NOTED. PT DENIES OF ANY PAIN OR
DISCOMFORTS AT THIS TIME. ALL SAFTEY PRECAUTIONS ARE IN PLACE WITH CALL LIGHT
IN REACH. WILL CONTINUE TO MONITOR

## 2020-10-16 VITALS — DIASTOLIC BLOOD PRESSURE: 96 MMHG | SYSTOLIC BLOOD PRESSURE: 166 MMHG

## 2020-10-16 VITALS — DIASTOLIC BLOOD PRESSURE: 79 MMHG | SYSTOLIC BLOOD PRESSURE: 141 MMHG

## 2020-10-16 VITALS — SYSTOLIC BLOOD PRESSURE: 162 MMHG | DIASTOLIC BLOOD PRESSURE: 91 MMHG

## 2020-10-16 VITALS — DIASTOLIC BLOOD PRESSURE: 90 MMHG | SYSTOLIC BLOOD PRESSURE: 154 MMHG

## 2020-10-16 LAB
ALBUMIN SERPL-MCNC: 3.6 G/DL (ref 3.2–5)
ALP SERPL-CCNC: 67 U/L (ref 38–126)
ANION GAP SERPL CALCULATED.3IONS-SCNC: 10 MMOL/L
AST SERPL-CCNC: 16 U/L (ref 9–36)
BASOPHILS NFR BLD AUTO: 1 % (ref 0–3)
BUN SERPL-MCNC: 19 MG/DL (ref 8–23)
BUN/CREAT SERPL: 24
CHLORIDE SERPL-SCNC: 101 MMOL/L (ref 95–108)
CO2 SERPL-SCNC: 31 MMOL/L (ref 22–30)
CREAT SERPL-MCNC: 0.8 MG/DL (ref 0.5–1)
EOSINOPHIL NFR BLD AUTO: 4 % (ref 0–8)
ERYTHROCYTE [DISTWIDTH] IN BLOOD BY AUTOMATED COUNT: 16.6 % (ref 11.5–15.5)
HCT VFR BLD AUTO: 31.6 % (ref 37–47)
HGB BLD-MCNC: 9.5 G/DL (ref 12–16)
IMM GRANULOCYTES NFR BLD: 0.5 % (ref 0–5)
LYMPHOCYTES NFR BLD: 35 % (ref 15–41)
MCH RBC QN AUTO: 22.9 PG  CALC (ref 26–32)
MCHC RBC AUTO-ENTMCNC: 30.1 G/DL CAL (ref 32–36)
MCV RBC AUTO: 76.3 FL  CALC (ref 80–100)
MONOCYTES NFR BLD AUTO: 10 % (ref 2–13)
NEUTROPHILS # BLD AUTO: 3.78 THOU/UL (ref 2–7.15)
NEUTROPHILS NFR BLD AUTO: 50 % (ref 42–76)
PLATELET # BLD AUTO: 372 THOU/UL (ref 130–400)
POTASSIUM SERPL-SCNC: 3.9 MMOL/L (ref 3.5–5.1)
PROT SERPL-MCNC: 6.3 G/DL (ref 6.3–8.2)
RBC # BLD AUTO: 4.14 MILL/UL (ref 4.2–5.6)
SODIUM SERPL-SCNC: 138 MMOL/L (ref 137–146)

## 2020-10-16 NOTE — NUR
PT IS WAITING TO BE DC AND FOR HER RIDE. TOLD PT I WILL WORK HER HER PAPERS.
PT DENIES ANY OTHER NEEDS AT THIS TIME. CALL LIGHT IN REACH.

## 2020-10-16 NOTE — NUR
PT MEDICATED AS ORDERS PROVIDE FOR PAIN 9/10 ON PAIN SCALE. NO S/O DISTRESS AT
THIS TIME. LAB JUST LEFT PT AND V/S JUST OBTAINED BY CNA. PT DENIES ANY OTHER
NEEDS. CALL LIGHT AT SIDE AND PT ENCOURAGED TO CALL AS ANY NEEDS ARISE.

## 2020-10-19 NOTE — NUR
Pneumonia post discharge follow up call completed 10/19/20.  Pt. states she is
doing well and progressing nicely.  No fever, chills, SOB, or excessive
fatigue.  Home Health nurse has visited and PT is scheduled to come as well..
Patient obtained discharge medication and is taking without difficulty.
Follow up with PCP has not been scheduled yet, but patient assures me she will
schedule soon.  No questions or needs expressed.

## 2022-01-24 ENCOUNTER — HOSPITAL ENCOUNTER (OUTPATIENT)
Dept: HOSPITAL 82 - ED | Age: 67
Setting detail: OBSERVATION
LOS: 3 days | Discharge: HOME | End: 2022-01-27
Attending: INTERNAL MEDICINE | Admitting: EMERGENCY MEDICINE
Payer: MEDICARE

## 2022-01-24 VITALS — DIASTOLIC BLOOD PRESSURE: 97 MMHG | SYSTOLIC BLOOD PRESSURE: 187 MMHG

## 2022-01-24 VITALS — SYSTOLIC BLOOD PRESSURE: 199 MMHG | DIASTOLIC BLOOD PRESSURE: 93 MMHG

## 2022-01-24 VITALS — SYSTOLIC BLOOD PRESSURE: 178 MMHG | DIASTOLIC BLOOD PRESSURE: 102 MMHG

## 2022-01-24 VITALS — BODY MASS INDEX: 28.6 KG/M2 | WEIGHT: 167.55 LBS | HEIGHT: 64 IN

## 2022-01-24 VITALS — SYSTOLIC BLOOD PRESSURE: 196 MMHG | DIASTOLIC BLOOD PRESSURE: 95 MMHG

## 2022-01-24 VITALS — SYSTOLIC BLOOD PRESSURE: 149 MMHG | DIASTOLIC BLOOD PRESSURE: 90 MMHG

## 2022-01-24 VITALS — DIASTOLIC BLOOD PRESSURE: 93 MMHG | SYSTOLIC BLOOD PRESSURE: 182 MMHG

## 2022-01-24 VITALS — DIASTOLIC BLOOD PRESSURE: 90 MMHG | SYSTOLIC BLOOD PRESSURE: 142 MMHG

## 2022-01-24 VITALS — DIASTOLIC BLOOD PRESSURE: 87 MMHG | SYSTOLIC BLOOD PRESSURE: 164 MMHG

## 2022-01-24 VITALS — DIASTOLIC BLOOD PRESSURE: 97 MMHG | SYSTOLIC BLOOD PRESSURE: 142 MMHG

## 2022-01-24 DIAGNOSIS — I16.1: ICD-10-CM

## 2022-01-24 DIAGNOSIS — R79.89: ICD-10-CM

## 2022-01-24 DIAGNOSIS — I11.0: ICD-10-CM

## 2022-01-24 DIAGNOSIS — E83.42: ICD-10-CM

## 2022-01-24 DIAGNOSIS — D50.9: Primary | ICD-10-CM

## 2022-01-24 DIAGNOSIS — R09.02: ICD-10-CM

## 2022-01-24 DIAGNOSIS — E78.5: ICD-10-CM

## 2022-01-24 DIAGNOSIS — I50.9: ICD-10-CM

## 2022-01-24 DIAGNOSIS — E87.6: ICD-10-CM

## 2022-01-24 DIAGNOSIS — I48.91: ICD-10-CM

## 2022-01-24 DIAGNOSIS — Z20.822: ICD-10-CM

## 2022-01-24 DIAGNOSIS — G89.4: ICD-10-CM

## 2022-01-24 DIAGNOSIS — F17.210: ICD-10-CM

## 2022-01-24 DIAGNOSIS — J44.9: ICD-10-CM

## 2022-01-24 DIAGNOSIS — K44.9: ICD-10-CM

## 2022-01-24 LAB
ALBUMIN SERPL-MCNC: 4 G/DL (ref 3.2–5)
ALP SERPL-CCNC: 89 U/L (ref 38–126)
ANION GAP SERPL CALCULATED.3IONS-SCNC: 10 MMOL/L
AST SERPL-CCNC: 30 U/L (ref 9–36)
BASOPHILS NFR BLD AUTO: 0 % (ref 0–3)
BILIRUB UR QL STRIP.AUTO: NEGATIVE
BUN SERPL-MCNC: 9 MG/DL (ref 8–23)
BUN/CREAT SERPL: 17
CHLORIDE SERPL-SCNC: 106 MMOL/L (ref 95–108)
CO2 SERPL-SCNC: 26 MMOL/L (ref 22–30)
COLOR UR AUTO: YELLOW
CREAT SERPL-MCNC: 0.5 MG/DL (ref 0.5–1)
EOSINOPHIL NFR BLD AUTO: 0 % (ref 0–8)
ERYTHROCYTE [DISTWIDTH] IN BLOOD BY AUTOMATED COUNT: 22.3 % (ref 11.5–15.5)
GLUCOSE UR STRIP.AUTO-MCNC: NEGATIVE MG/DL
HCT VFR BLD AUTO: 24.3 % (ref 37–47)
HCT VFR BLD AUTO: 30.2 % (ref 37–47)
HGB BLD-MCNC: 6.6 G/DL (ref 12–16)
HGB BLD-MCNC: 8.6 G/DL (ref 12–16)
HGB UR QL STRIP.AUTO: (no result)
IMM GRANULOCYTES NFR BLD: 0.3 % (ref 0–5)
KETONES UR STRIP.AUTO-MCNC: NEGATIVE MG/DL
LEUKOCYTE ESTERASE UR QL STRIP.AUTO: NEGATIVE
LYMPHOCYTES NFR BLD: 5 % (ref 15–41)
MCH RBC QN AUTO: 16 PG  CALC (ref 26–32)
MCHC RBC AUTO-ENTMCNC: 27.2 G/DL CAL (ref 32–36)
MCV RBC AUTO: 59 FL  CALC (ref 80–100)
MONOCYTES NFR BLD AUTO: 4 % (ref 2–13)
MYOGLOBIN SERPL-MCNC: 36 NG/ML (ref 0–62)
NEUTROPHILS # BLD AUTO: 7.02 THOU/UL (ref 2–7.15)
NEUTROPHILS NFR BLD AUTO: 90 % (ref 42–76)
NITRITE UR QL STRIP.AUTO: NEGATIVE
PH UR STRIP.AUTO: 6 [PH] (ref 4.5–8)
PLATELET # BLD AUTO: 375 THOU/UL (ref 130–400)
POTASSIUM SERPL-SCNC: 3.5 MMOL/L (ref 3.5–5.1)
PROT SERPL-MCNC: 7.1 G/DL (ref 6.3–8.2)
PROT UR QL STRIP.AUTO: 30 MG/DL
RBC # BLD AUTO: 4.12 MILL/UL (ref 4.2–5.6)
RBC #/AREA URNS HPF: (no result) RBC/HPF (ref 0–5)
SODIUM SERPL-SCNC: 139 MMOL/L (ref 137–146)
SP GR UR STRIP.AUTO: 1.02
SQUAMOUS URNS QL MICRO: (no result) EPI/HPF
UROBILINOGEN UR QL STRIP.AUTO: 0.2 E.U./DL
WBC #/AREA URNS HPF: (no result) WBC/HPF (ref 0–5)

## 2022-01-24 PROCEDURE — 30233N1 TRANSFUSION OF NONAUTOLOGOUS RED BLOOD CELLS INTO PERIPHERAL VEIN, PERCUTANEOUS APPROACH: ICD-10-PCS | Performed by: INTERNAL MEDICINE

## 2022-01-24 PROCEDURE — P9016 RBC LEUKOCYTES REDUCED: HCPCS

## 2022-01-24 PROCEDURE — S0164 INJECTION PANTROPRAZOLE: HCPCS

## 2022-01-24 NOTE — NUR
PATIENT BLOOD TRANSFUSION STARTED AT THIS TIME. VITAL SIGNS TAKEN SEE CHART.
T.98.2 P. 85 R. 18 /90 SPO2 % ON 3LITERS OF O2 AT THIS TIME.
PATIENT WAS EDUCATED ON BLOOD TRANSFUSION COMPLICATIONS AT THIS TIME. PATEINT
VERBALIZED UNDERSTANDING OF THE EDUCATION AT THIS TIME. TELE MONITOR ON
PATEINT AND WILL CONTINUE TO MONITOR.

## 2022-01-24 NOTE — NUR
PATIENT RESTING IN BED DENEIS ANY NEEDS VS TAKEN AT TIME SEE INTERVENTION
BLOOD IS TRANSFUSING (2ND UNIT) AT 125ML/HR. SIDERAILS ARE UP CALL LIGHT IS
WIHTIN REACH. WILL CONTINUTE TO MONITOR.

## 2022-01-24 NOTE — NUR
Patient has been screened for physical therapy and would benefit from
evaluation and treatment by physical therapy.

## 2022-01-24 NOTE — NUR
PT'S IV IN RAC HAS INFILTRATED, BLOOD TRANSFUSION STOPPED, IV D/C. PT WITH HARD
AREA AND PAIN ABOVE IV SITE. WARM COMPRESS APPLIED. CHARGE RN NOTIFIED. PT
STABLE. PT RECEIVED 72ML OF BLOOD. DR DUMAS NOTIFIED.

## 2022-01-24 NOTE — NUR
NOTIFIED ON CALL PROVIDER OF PATIENTS CH TROPONIN OF 0.155. NO NEW ORDERS
RECEIVED. PATIENT DENIES ANY CHEST PAIN OR DISTRESS AT THIS TIME.

## 2022-01-24 NOTE — NUR
TRANSFUSION ENDED AT THIS TIME. PATIENT DEINES ANY NEEDS AT THIS TIME. TELE
MONITOR ON AND BEING MONITORED BY ED.

## 2022-01-24 NOTE — NUR
PATIENT RECEIVED FROM ED AT THIS TIME. REPORT GIVEN TO THIS NURSE BY SRINIVASA GUILLORY RN. PATIETN ALERT AND ORINETED AT THIS TIME. PATIENT STATES SHE HAS HAD
DIARREHA TODAY AND WAS INCONTINENT WHEN SHE WAS TRANSFERED TO BED. PATIENT
PRESENTS WITH BILARERAL LOWER EDEMA 1+ AT THIS TIME. LUNG SOUNDS ARE CLEAR IN
UPPER FIELDS AND DIMINISHED IN LOWER WISEMAN. RN ASSESSMENT DONE SEE
INTERVENTIONS SIDERAILS ARE UP X 2 CALL LIGHT WITHIN REACH AND PATIENT SIGNED
ROOM SAFETY AGREEMENT. TELE MONITOR ON AND BEING MONITORED BY ED. WILL
CONTINUE TO MONITOR.

## 2022-01-24 NOTE — NUR
PATIENT RESTING AT THIS TIME DENIES ANY PAIN OR BACK PAIN OR SHORTNESS OF
BREATH. VITAL SIGNS ARE 98.3 PULSE 88 RESP. 18 /102 AT THIS TIME. JAYDE OSBORN NOTIFIED OF BLOOD PRESSURE AND STATED CONTINUE WITH BLOOD AND
MEDICATION ORDER GIVEN FOR BLOOD PRESSURE AT THIS TIME. SIDERAILS ARE UP CALL
LIGHT IS WITHIN REACH. WILL CONTINUE TO MONITOR.

## 2022-01-24 NOTE — NUR
PAITENT RESTING IN BED AT THIS TIME SIDERAILS ARE UP CALL LIGHT NEAR. PATIENT
GIVEN EDUCATION ON RECEIVING BLOOD AND SIGNS AND SYMPTOMS OF COMPLICATIONS
PATIENT VERBALIZES UNDERSTANDING AT THIS TIME. TELE MONITOR IN PLACE CALL
LIGHT NEAR SIDERAILS ARE UP X 2

## 2022-01-24 NOTE — NUR
rec'd to icu5 per bed from [facility]. report rec'd per joaquin arellano. pt denies
c/o chest pain. cardiac monitor shows a fib pvcs hr 83. o2 cont per nc. #22
lfa saline lock. fall precautions cont. incont of urine. cleansed per cna x2.
instructed pt to call for any assist.

## 2022-01-24 NOTE — NUR
ED TECH CALLED STATING PATINET TELE MONITOR SHOWING PATINET IN A-FIB AT THIS
TIME. JAYDE OSBORN NOTIFIED OF CHANGE IN RYTHM AT THIS TIME. WILL CONTINUE TO
MONITOR.

## 2022-01-24 NOTE — NUR
PATIENT ALERT AND ORIENTED. ABLE TO MAKE NEEDS KNOWN. ASSESSMENT COMPLETE. NO
SIGNS OF DISTRESS NOTED. NO COMPLAINTS OF PAIN AT THIS TIME. ENCOURAGED
PATIENT TO UTILIZE CALL LIGHT TO ASK FOR ASSISTANCE. PATIENT TENDS TO SIT UP
ON SIDE OF THE BED. CALL LIGHT AND BELONGINGS REMAIN IN REACH.

## 2022-01-24 NOTE — NUR
PATIENT D/C AT THIS TIME IV REMOVED TIP INTACT PATIENT STATED SHE UNDERSTANDS
D/C INSTRUCTIONS AT THIS TIME.

## 2022-01-24 NOTE — NUR
PT WITH BLOOD TRANSFUSING. PT STATES SHE CONTINUES TO HAVE UPPER ABDOMINAL PAIN
AND RATES AT 7/10. PT'S BP ELEVATED AT THIS TIME 196/97. BREATHING IMPROVED PER
PT AT 3L OXYGEN VIA NC-SATS 97%. PT ABLE TO SPEAK IN FULL SENTENCES AND STATES
PAIN IS WORSE AT TIMES AND THEN LESSENS. WILL CONTINUE TO MONITOR

## 2022-01-25 VITALS — DIASTOLIC BLOOD PRESSURE: 82 MMHG | SYSTOLIC BLOOD PRESSURE: 147 MMHG

## 2022-01-25 VITALS — DIASTOLIC BLOOD PRESSURE: 93 MMHG | SYSTOLIC BLOOD PRESSURE: 182 MMHG

## 2022-01-25 VITALS — SYSTOLIC BLOOD PRESSURE: 107 MMHG | DIASTOLIC BLOOD PRESSURE: 59 MMHG

## 2022-01-25 VITALS — SYSTOLIC BLOOD PRESSURE: 116 MMHG | DIASTOLIC BLOOD PRESSURE: 76 MMHG

## 2022-01-25 VITALS — DIASTOLIC BLOOD PRESSURE: 89 MMHG | SYSTOLIC BLOOD PRESSURE: 175 MMHG

## 2022-01-25 VITALS — SYSTOLIC BLOOD PRESSURE: 140 MMHG | DIASTOLIC BLOOD PRESSURE: 68 MMHG

## 2022-01-25 VITALS — SYSTOLIC BLOOD PRESSURE: 113 MMHG | DIASTOLIC BLOOD PRESSURE: 72 MMHG

## 2022-01-25 VITALS — SYSTOLIC BLOOD PRESSURE: 182 MMHG | DIASTOLIC BLOOD PRESSURE: 80 MMHG

## 2022-01-25 VITALS — DIASTOLIC BLOOD PRESSURE: 72 MMHG | SYSTOLIC BLOOD PRESSURE: 132 MMHG

## 2022-01-25 VITALS — DIASTOLIC BLOOD PRESSURE: 82 MMHG | SYSTOLIC BLOOD PRESSURE: 174 MMHG

## 2022-01-25 LAB
ANION GAP SERPL CALCULATED.3IONS-SCNC: 12 MMOL/L
BUN SERPL-MCNC: 10 MG/DL (ref 8–23)
BUN/CREAT SERPL: 16
CHLORIDE SERPL-SCNC: 102 MMOL/L (ref 95–108)
CHOLEST SERPL-MCNC: 131 MG/DL (ref 0–199)
CHOLEST/HDLC SERPL: 2.6 {RATIO}
CO2 SERPL-SCNC: 30 MMOL/L (ref 22–30)
CREAT SERPL-MCNC: 0.7 MG/DL (ref 0.5–1)
ERYTHROCYTE [DISTWIDTH] IN BLOOD BY AUTOMATED COUNT: 26.5 % (ref 11.5–15.5)
HCT VFR BLD AUTO: 28.5 % (ref 37–47)
HDLC SERPL-MCNC: 50 MG/DL (ref 40–?)
HGB BLD-MCNC: 8.2 G/DL (ref 12–16)
LDLC SERPL CALC-MCNC: 68 MG/DL
MAGNESIUM SERPL-MCNC: 1.5 MG/DL (ref 1.6–2.3)
MCH RBC QN AUTO: 18 PG  CALC (ref 26–32)
MCHC RBC AUTO-ENTMCNC: 28.8 G/DL CAL (ref 32–36)
MCV RBC AUTO: 62.6 FL  CALC (ref 80–100)
PLATELET # BLD AUTO: 337 THOU/UL (ref 130–400)
POTASSIUM SERPL-SCNC: 3.4 MMOL/L (ref 3.5–5.1)
RBC # BLD AUTO: 4.55 MILL/UL (ref 4.2–5.6)
SODIUM SERPL-SCNC: 140 MMOL/L (ref 137–146)
TRIGL SERPL-MCNC: 64 MG/DL (ref 30–149)
VLDLC SERPL CALC-MCNC: 13 MG/DL

## 2022-01-25 NOTE — NUR
PT SEEN % NC, TURNED DOWN TO 2 LPM.  PT STATED THAT IT WAS BOTHERING HER
NOSE, NC REMOVED.  SATS SEEN NOW IN THE LOW 90s.

## 2022-01-25 NOTE — NUR
PT AWAKE, ALERT, ORIENTED X 3.  PT WITH PAIN ACROSS LOWER ABDOMEN, MEDICATED.
PT WITH CLEAR BUT DIMINISHED LUNG SOUNDS, 2 LPM NC, NOW OFF AS SATS WERE MID
90s.  PT WITH PUREWICK IN PLACE NOW, HAS BEEN GIVEN LASIX RECENTLY.

## 2022-01-25 NOTE — NUR
PT HAS BEEN ASSISTED IN BEDBATH BY RICKY FRAUSTO.  PT CONTINUES ALERT AND ORIENTED
X 3.  PT NC OFF, SATS DOING WELL WITHOUT SUPPLEMENTAL OXYGEN.  FAMILY CALLS TO
CHECK ON PT.

## 2022-01-25 NOTE — NUR
awakens easily. no distress. cardiac monitor shows a fib pvcs. #22 lfa saline
lock. po fluids remain restricted. voids per bsc. fall precautions cont.

## 2022-01-26 VITALS — DIASTOLIC BLOOD PRESSURE: 70 MMHG | SYSTOLIC BLOOD PRESSURE: 106 MMHG

## 2022-01-26 VITALS — DIASTOLIC BLOOD PRESSURE: 63 MMHG | SYSTOLIC BLOOD PRESSURE: 122 MMHG

## 2022-01-26 VITALS — DIASTOLIC BLOOD PRESSURE: 70 MMHG | SYSTOLIC BLOOD PRESSURE: 126 MMHG

## 2022-01-26 VITALS — SYSTOLIC BLOOD PRESSURE: 143 MMHG | DIASTOLIC BLOOD PRESSURE: 78 MMHG

## 2022-01-26 VITALS — SYSTOLIC BLOOD PRESSURE: 115 MMHG | DIASTOLIC BLOOD PRESSURE: 67 MMHG

## 2022-01-26 VITALS — SYSTOLIC BLOOD PRESSURE: 141 MMHG | DIASTOLIC BLOOD PRESSURE: 88 MMHG

## 2022-01-26 VITALS — DIASTOLIC BLOOD PRESSURE: 75 MMHG | SYSTOLIC BLOOD PRESSURE: 116 MMHG

## 2022-01-26 VITALS — DIASTOLIC BLOOD PRESSURE: 89 MMHG | SYSTOLIC BLOOD PRESSURE: 143 MMHG

## 2022-01-26 VITALS — SYSTOLIC BLOOD PRESSURE: 114 MMHG | DIASTOLIC BLOOD PRESSURE: 72 MMHG

## 2022-01-26 VITALS — DIASTOLIC BLOOD PRESSURE: 65 MMHG | SYSTOLIC BLOOD PRESSURE: 100 MMHG

## 2022-01-26 LAB
ANION GAP SERPL CALCULATED.3IONS-SCNC: 5 MMOL/L
BASOPHILS NFR BLD AUTO: 1 % (ref 0–3)
BUN SERPL-MCNC: 12 MG/DL (ref 8–23)
BUN/CREAT SERPL: 16
CHLORIDE SERPL-SCNC: 102 MMOL/L (ref 95–108)
CO2 SERPL-SCNC: 34 MMOL/L (ref 22–30)
CREAT SERPL-MCNC: 0.7 MG/DL (ref 0.5–1)
EOSINOPHIL NFR BLD AUTO: 6 % (ref 0–8)
ERYTHROCYTE [DISTWIDTH] IN BLOOD BY AUTOMATED COUNT: 27.3 % (ref 11.5–15.5)
HCT VFR BLD AUTO: 28.8 % (ref 37–47)
HGB BLD-MCNC: 8.2 G/DL (ref 12–16)
IMM GRANULOCYTES NFR BLD: 0.1 % (ref 0–5)
LYMPHOCYTES NFR BLD: 26 % (ref 15–41)
MAGNESIUM SERPL-MCNC: 2 MG/DL (ref 1.6–2.3)
MCH RBC QN AUTO: 18.2 PG  CALC (ref 26–32)
MCHC RBC AUTO-ENTMCNC: 28.5 G/DL CAL (ref 32–36)
MCV RBC AUTO: 64 FL  CALC (ref 80–100)
MONOCYTES NFR BLD AUTO: 11 % (ref 2–13)
NEUTROPHILS # BLD AUTO: 4.41 THOU/UL (ref 2–7.15)
NEUTROPHILS NFR BLD AUTO: 57 % (ref 42–76)
PLATELET # BLD AUTO: 299 THOU/UL (ref 130–400)
POTASSIUM SERPL-SCNC: 3.5 MMOL/L (ref 3.5–5.1)
RBC # BLD AUTO: 4.5 MILL/UL (ref 4.2–5.6)
SODIUM SERPL-SCNC: 137 MMOL/L (ref 137–146)

## 2022-01-26 NOTE — NUR
REPORT RECEIVED FROM MARCO LEWIS RN. CARE OF PT ASSUMED AT THIS TIME.
TRANSFER TO [facility] PENDING.

## 2022-01-26 NOTE — NUR
PT VISITED BY HER GRANDDAUGHTER TODAY.  NO CHANGE IN STATUS.  PT AWARE OF
PROBABLE DISCHARGE TO HOME TOMORROW.

## 2022-01-26 NOTE — NUR
PT SEEN AWAKE, ALERT, ORIENTED X 3.  DR WARNER IN TO SEE PT, WRITES TRANSFER
ORDERS TO M.S.  PT RECEIVED PERCOCET AS SCHEDULED, THEN MORPHINE.  NO
DISTRESS, NO WEAKNESS NOTED.  VISITOR AT BEDSIDE AT THIS TIME.

## 2022-01-27 VITALS — DIASTOLIC BLOOD PRESSURE: 77 MMHG | SYSTOLIC BLOOD PRESSURE: 146 MMHG

## 2022-01-27 VITALS — SYSTOLIC BLOOD PRESSURE: 135 MMHG | DIASTOLIC BLOOD PRESSURE: 76 MMHG

## 2022-01-27 VITALS — SYSTOLIC BLOOD PRESSURE: 152 MMHG | DIASTOLIC BLOOD PRESSURE: 78 MMHG

## 2022-01-27 VITALS — SYSTOLIC BLOOD PRESSURE: 146 MMHG | DIASTOLIC BLOOD PRESSURE: 77 MMHG

## 2022-01-27 VITALS — SYSTOLIC BLOOD PRESSURE: 131 MMHG | DIASTOLIC BLOOD PRESSURE: 82 MMHG

## 2022-01-27 VITALS — SYSTOLIC BLOOD PRESSURE: 155 MMHG | DIASTOLIC BLOOD PRESSURE: 86 MMHG

## 2022-01-27 VITALS — DIASTOLIC BLOOD PRESSURE: 69 MMHG | SYSTOLIC BLOOD PRESSURE: 135 MMHG

## 2022-01-27 VITALS — SYSTOLIC BLOOD PRESSURE: 104 MMHG | DIASTOLIC BLOOD PRESSURE: 58 MMHG

## 2022-01-27 LAB
ANION GAP SERPL CALCULATED.3IONS-SCNC: 7 MMOL/L
BASOPHILS NFR BLD AUTO: 1 % (ref 0–3)
BUN SERPL-MCNC: 15 MG/DL (ref 8–23)
BUN/CREAT SERPL: 22
CHLORIDE SERPL-SCNC: 103 MMOL/L (ref 95–108)
CO2 SERPL-SCNC: 32 MMOL/L (ref 22–30)
CREAT SERPL-MCNC: 0.7 MG/DL (ref 0.5–1)
EOSINOPHIL NFR BLD AUTO: 5 % (ref 0–8)
ERYTHROCYTE [DISTWIDTH] IN BLOOD BY AUTOMATED COUNT: 28.4 % (ref 11.5–15.5)
HCT VFR BLD AUTO: 28 % (ref 37–47)
HGB BLD-MCNC: 7.7 G/DL (ref 12–16)
IMM GRANULOCYTES NFR BLD: 0.1 % (ref 0–5)
LYMPHOCYTES NFR BLD: 24 % (ref 15–41)
MAGNESIUM SERPL-MCNC: 1.7 MG/DL (ref 1.6–2.3)
MCH RBC QN AUTO: 17.9 PG  CALC (ref 26–32)
MCHC RBC AUTO-ENTMCNC: 27.5 G/DL CAL (ref 32–36)
MCV RBC AUTO: 65.1 FL  CALC (ref 80–100)
MONOCYTES NFR BLD AUTO: 12 % (ref 2–13)
NEUTROPHILS # BLD AUTO: 4.16 THOU/UL (ref 2–7.15)
NEUTROPHILS NFR BLD AUTO: 58 % (ref 42–76)
PLATELET # BLD AUTO: 290 THOU/UL (ref 130–400)
POTASSIUM SERPL-SCNC: 3.6 MMOL/L (ref 3.5–5.1)
RBC # BLD AUTO: 4.3 MILL/UL (ref 4.2–5.6)
SODIUM SERPL-SCNC: 138 MMOL/L (ref 137–146)

## 2022-01-27 PROCEDURE — 30233N1 TRANSFUSION OF NONAUTOLOGOUS RED BLOOD CELLS INTO PERIPHERAL VEIN, PERCUTANEOUS APPROACH: ICD-10-PCS | Performed by: INTERNAL MEDICINE

## 2022-01-27 NOTE — NUR
SHIFT CHANGE REPORT, PT AWAKE ALERT AND ORIENTED LYING IN SUPINE POSITION IN
BED, C/O ACHING ABD PAIN @ 8/10, ISSUE ADDRESSED. PT UP TO BSC AND C/O UNABLE
TO BREATHE, ON ASSESSMENT BREATHS WERE NON-ALBORED AND EVEN, O2 SATS = 97-99%
ON R/A. PT ADVISED TO CHANGE POSITION SLOWLY, ALSO GIVEN EDUCATION ON SMOKING
CESSATION, CALL BELL IN REACH, WILL CONTINUE TO MONITOR.

## 2022-01-27 NOTE — NUR
Discharge instructions given. Patient verbalizes understanding of same.
Discharged in fair condition via Wheelchair to Home with
family. All belongings sent with pt.

## 2022-01-27 NOTE — NUR
PT SHEFALI , AWOKEN  BY WRITTER, REQUESTING A BLANKET, BLANKET PROVIDED. CALL
LIGHT AND BEDSIDE TABLE WITHIN REACH.

## 2022-02-06 ENCOUNTER — HOSPITAL ENCOUNTER (EMERGENCY)
Dept: HOSPITAL 82 - ED | Age: 67
LOS: 1 days | Discharge: ANOTHER HOSPITAL | End: 2022-02-07
Payer: MEDICARE

## 2022-02-06 VITALS — HEIGHT: 64 IN | BODY MASS INDEX: 29.73 KG/M2 | WEIGHT: 174.17 LBS

## 2022-02-06 DIAGNOSIS — R79.89: ICD-10-CM

## 2022-02-06 DIAGNOSIS — I11.0: ICD-10-CM

## 2022-02-06 DIAGNOSIS — J44.9: ICD-10-CM

## 2022-02-06 DIAGNOSIS — I50.9: ICD-10-CM

## 2022-02-06 DIAGNOSIS — I25.10: ICD-10-CM

## 2022-02-06 DIAGNOSIS — I48.91: Primary | ICD-10-CM

## 2022-02-06 DIAGNOSIS — Z79.01: ICD-10-CM

## 2022-02-06 DIAGNOSIS — Z20.822: ICD-10-CM

## 2022-02-06 DIAGNOSIS — F17.200: ICD-10-CM

## 2022-02-06 LAB
ALBUMIN SERPL-MCNC: 3.4 G/DL (ref 3.2–5)
ALP SERPL-CCNC: 69 U/L (ref 38–126)
ANION GAP SERPL CALCULATED.3IONS-SCNC: 11 MMOL/L
AST SERPL-CCNC: 95 U/L (ref 9–36)
BACTERIA #/AREA URNS HPF: (no result) HPF
BASOPHILS NFR BLD AUTO: 1 % (ref 0–3)
BILIRUB UR QL STRIP.AUTO: (no result)
BUN SERPL-MCNC: 22 MG/DL (ref 8–23)
BUN/CREAT SERPL: 33
CHLORIDE SERPL-SCNC: 106 MMOL/L (ref 95–108)
CO2 SERPL-SCNC: 27 MMOL/L (ref 22–30)
COLOR UR AUTO: YELLOW
CREAT SERPL-MCNC: 0.7 MG/DL (ref 0.5–1)
EOSINOPHIL NFR BLD AUTO: 2 % (ref 0–8)
EPI CELLS URNS QL MICRO: (no result) EPI/HPF
GLUCOSE UR STRIP.AUTO-MCNC: NEGATIVE MG/DL
HCT VFR BLD AUTO: 38.2 % (ref 37–47)
HGB BLD-MCNC: 11.1 G/DL (ref 12–16)
HGB UR QL STRIP.AUTO: (no result)
IMM GRANULOCYTES NFR BLD: 0.3 % (ref 0–5)
KETONES UR STRIP.AUTO-MCNC: NEGATIVE MG/DL
LEUKOCYTE ESTERASE UR QL STRIP.AUTO: NEGATIVE
LYMPHOCYTES NFR BLD: 13 % (ref 15–41)
MCH RBC QN AUTO: 21.6 PG  CALC (ref 26–32)
MCHC RBC AUTO-ENTMCNC: 29.1 G/DL CAL (ref 32–36)
MCV RBC AUTO: 74.5 FL  CALC (ref 80–100)
MONOCYTES NFR BLD AUTO: 9 % (ref 2–13)
NEUTROPHILS # BLD AUTO: 5.89 THOU/UL (ref 2–7.15)
NEUTROPHILS NFR BLD AUTO: 75 % (ref 42–76)
NITRITE UR QL STRIP.AUTO: NEGATIVE
PH UR STRIP.AUTO: 6 [PH] (ref 4.5–8)
PLATELET # BLD AUTO: 318 THOU/UL (ref 130–400)
POTASSIUM SERPL-SCNC: 4.1 MMOL/L (ref 3.5–5.1)
PROT SERPL-MCNC: 6.4 G/DL (ref 6.3–8.2)
PROT UR QL STRIP.AUTO: (no result) MG/DL
RBC # BLD AUTO: 5.13 MILL/UL (ref 4.2–5.6)
RBC #/AREA URNS HPF: (no result) RBC/HPF (ref 0–5)
SODIUM SERPL-SCNC: 140 MMOL/L (ref 137–146)
SP GR UR STRIP.AUTO: 1.02
UROBILINOGEN UR QL STRIP.AUTO: 0.2 E.U./DL
WBC #/AREA URNS HPF: (no result) WBC/HPF (ref 0–5)

## 2022-02-07 VITALS — DIASTOLIC BLOOD PRESSURE: 77 MMHG | SYSTOLIC BLOOD PRESSURE: 157 MMHG

## 2022-02-07 LAB — MYOGLOBIN SERPL-MCNC: 1024 NG/ML (ref 0–62)

## 2023-12-18 NOTE — NUR
ASSESSMENT DONE. PT IS A&O X3. PT STATED PAIN IN ABD 4/10 BUT DENIES PAIN
MEDICATION. IVF INFUSING WELL. PT DENIES ANY NEEDS AT THIS TIME. CALL LIGHT IN
REACH. No